# Patient Record
Sex: MALE | Race: BLACK OR AFRICAN AMERICAN | Employment: OTHER | URBAN - METROPOLITAN AREA
[De-identification: names, ages, dates, MRNs, and addresses within clinical notes are randomized per-mention and may not be internally consistent; named-entity substitution may affect disease eponyms.]

---

## 2023-10-19 PROBLEM — M10.9 GOUT: Status: ACTIVE | Noted: 2023-10-19

## 2023-10-19 PROBLEM — M48.062 NEUROGENIC CLAUDICATION DUE TO LUMBAR SPINAL STENOSIS: Status: ACTIVE | Noted: 2023-10-19

## 2023-10-19 PROBLEM — I50.9 HEART FAILURE (MULTI): Status: ACTIVE | Noted: 2023-10-19

## 2023-10-19 PROBLEM — M16.0 BILATERAL HIP JOINT ARTHRITIS: Status: ACTIVE | Noted: 2023-10-19

## 2023-10-19 PROBLEM — M70.62 TROCHANTERIC BURSITIS, LEFT HIP: Status: ACTIVE | Noted: 2023-10-19

## 2023-10-19 PROBLEM — I48.91 ATRIAL FIBRILLATION (MULTI): Status: ACTIVE | Noted: 2023-10-19

## 2023-10-24 ENCOUNTER — HOSPITAL ENCOUNTER (OUTPATIENT)
Dept: RADIOLOGY | Facility: HOSPITAL | Age: 68
Discharge: HOME | End: 2023-10-24
Payer: MEDICARE

## 2023-10-24 DIAGNOSIS — M16.0 BILATERAL HIP JOINT ARTHRITIS: ICD-10-CM

## 2023-10-24 DIAGNOSIS — M48.062 NEUROGENIC CLAUDICATION DUE TO LUMBAR SPINAL STENOSIS: ICD-10-CM

## 2023-10-24 PROCEDURE — 73523 X-RAY EXAM HIPS BI 5/> VIEWS: CPT | Mod: BILATERAL PROCEDURE | Performed by: RADIOLOGY

## 2023-10-24 PROCEDURE — 73522 X-RAY EXAM HIPS BI 3-4 VIEWS: CPT | Mod: FY

## 2023-10-24 PROCEDURE — 72110 X-RAY EXAM L-2 SPINE 4/>VWS: CPT | Mod: FY

## 2023-10-24 PROCEDURE — 72110 X-RAY EXAM L-2 SPINE 4/>VWS: CPT | Performed by: RADIOLOGY

## 2023-11-07 ENCOUNTER — HOSPITAL ENCOUNTER (OUTPATIENT)
Dept: RADIOLOGY | Facility: HOSPITAL | Age: 68
End: 2023-11-07
Payer: MEDICARE

## 2023-11-07 ENCOUNTER — APPOINTMENT (OUTPATIENT)
Dept: PAIN MEDICINE | Facility: HOSPITAL | Age: 68
End: 2023-11-07
Payer: MEDICARE

## 2023-11-07 PROBLEM — E11.9 CONTROLLED TYPE 2 DIABETES MELLITUS WITHOUT COMPLICATION, WITHOUT LONG-TERM CURRENT USE OF INSULIN (MULTI): Status: ACTIVE | Noted: 2019-11-26

## 2023-11-07 PROBLEM — M19.90 ARTHRITIS: Status: ACTIVE | Noted: 2019-11-26

## 2023-11-07 PROBLEM — R29.3 POOR POSTURE: Status: ACTIVE | Noted: 2020-07-27

## 2023-11-07 PROBLEM — F10.20 ALCOHOL DEPENDENCE (MULTI): Status: ACTIVE | Noted: 2023-11-07

## 2023-11-07 PROBLEM — I25.83 CORONARY ATHEROSCLEROSIS DUE TO LIPID RICH PLAQUE (CODE): Status: ACTIVE | Noted: 2023-11-07

## 2023-11-07 PROBLEM — R20.2 PARESTHESIA OF SKIN: Status: ACTIVE | Noted: 2023-11-07

## 2023-11-07 PROBLEM — M19.019 PRIMARY LOCALIZED OSTEOARTHROSIS OF SHOULDER REGION: Status: ACTIVE | Noted: 2023-11-07

## 2023-11-07 PROBLEM — R94.31 EKG ABNORMALITIES: Status: ACTIVE | Noted: 2020-02-10

## 2023-11-07 PROBLEM — I16.0 HYPERTENSIVE URGENCY: Status: ACTIVE | Noted: 2020-02-11

## 2023-11-07 PROBLEM — N40.0 BPH (BENIGN PROSTATIC HYPERPLASIA): Status: ACTIVE | Noted: 2020-03-24

## 2023-11-07 PROBLEM — M19.90 OSTEOARTHRITIS: Status: ACTIVE | Noted: 2023-11-07

## 2023-11-07 PROBLEM — Z59.00 HOMELESS: Status: ACTIVE | Noted: 2023-11-07

## 2023-11-07 PROBLEM — I10 BENIGN HYPERTENSION: Status: ACTIVE | Noted: 2023-11-07

## 2023-11-07 PROBLEM — E83.39 HYPOPHOSPHATEMIA: Status: ACTIVE | Noted: 2019-12-04

## 2023-11-07 PROBLEM — E78.2 MIXED HYPERLIPIDEMIA: Status: ACTIVE | Noted: 2019-11-26

## 2023-11-07 PROBLEM — R73.9 HYPERGLYCEMIA: Status: ACTIVE | Noted: 2018-11-30

## 2023-11-07 PROBLEM — D64.9 ANEMIA: Status: ACTIVE | Noted: 2019-11-26

## 2023-11-07 PROBLEM — R91.1 SOLITARY PULMONARY NODULE: Status: ACTIVE | Noted: 2023-11-07

## 2023-11-07 PROBLEM — I50.42 CHRONIC COMBINED SYSTOLIC AND DIASTOLIC CHF (CONGESTIVE HEART FAILURE) (MULTI): Status: ACTIVE | Noted: 2019-11-07

## 2023-11-07 RX ORDER — ISOSORBIDE MONONITRATE 120 MG/1
120 TABLET, EXTENDED RELEASE ORAL EVERY MORNING
COMMUNITY
Start: 2022-10-14 | End: 2024-03-11 | Stop reason: SDUPTHER

## 2023-11-07 RX ORDER — SPIRONOLACTONE 50 MG/1
50 TABLET, FILM COATED ORAL DAILY
COMMUNITY
Start: 2023-10-10 | End: 2024-03-11 | Stop reason: SDUPTHER

## 2023-11-07 RX ORDER — HYDRALAZINE HYDROCHLORIDE 100 MG/1
1 TABLET, FILM COATED ORAL
COMMUNITY
Start: 2022-10-13 | End: 2024-03-11 | Stop reason: SDUPTHER

## 2023-11-07 RX ORDER — FLUTICASONE PROPIONATE 50 MCG
2 SPRAY, SUSPENSION (ML) NASAL
COMMUNITY
Start: 2007-07-25 | End: 2024-02-28 | Stop reason: ENTERED-IN-ERROR

## 2023-11-07 RX ORDER — CARVEDILOL 25 MG/1
1 TABLET ORAL
COMMUNITY
Start: 2020-08-11 | End: 2024-03-11 | Stop reason: SDUPTHER

## 2023-11-07 RX ORDER — FERROUS GLUCONATE 324(38)MG
324 TABLET ORAL
COMMUNITY
Start: 2021-12-01

## 2023-11-07 RX ORDER — TALC
3 POWDER (GRAM) TOPICAL NIGHTLY PRN
COMMUNITY
Start: 2018-12-03 | End: 2024-02-28 | Stop reason: ENTERED-IN-ERROR

## 2023-11-07 RX ORDER — IBUPROFEN 800 MG/1
800 TABLET ORAL EVERY 8 HOURS PRN
COMMUNITY
Start: 2023-01-26 | End: 2024-02-28 | Stop reason: ENTERED-IN-ERROR

## 2023-11-07 RX ORDER — ALLOPURINOL 300 MG/1
1 TABLET ORAL DAILY
COMMUNITY
Start: 2020-08-11

## 2023-11-07 RX ORDER — FUROSEMIDE 20 MG/1
1 TABLET ORAL DAILY
COMMUNITY
Start: 2022-04-24 | End: 2024-03-11 | Stop reason: SDUPTHER

## 2023-11-07 RX ORDER — TRAZODONE HYDROCHLORIDE 50 MG/1
1 TABLET ORAL NIGHTLY
COMMUNITY
Start: 2020-08-11 | End: 2024-02-28 | Stop reason: ENTERED-IN-ERROR

## 2023-11-07 RX ORDER — RIVAROXABAN 20 MG/1
1 TABLET, FILM COATED ORAL
COMMUNITY
Start: 2022-11-15 | End: 2024-02-28 | Stop reason: ENTERED-IN-ERROR

## 2023-11-07 RX ORDER — POTASSIUM CHLORIDE 750 MG/1
1 TABLET, EXTENDED RELEASE ORAL DAILY
COMMUNITY
End: 2024-02-28 | Stop reason: ENTERED-IN-ERROR

## 2023-11-07 RX ORDER — TAMSULOSIN HYDROCHLORIDE 0.4 MG/1
CAPSULE ORAL
COMMUNITY
Start: 2020-01-08

## 2023-11-07 RX ORDER — MAGNESIUM OXIDE 420 MG/1
1 TABLET ORAL 2 TIMES DAILY
COMMUNITY
Start: 2021-12-01 | End: 2024-02-28 | Stop reason: ENTERED-IN-ERROR

## 2023-11-07 RX ORDER — OMEGA-3 FATTY ACIDS 1000 MG
1 CAPSULE ORAL
COMMUNITY
Start: 2021-03-22

## 2023-11-07 RX ORDER — ATORVASTATIN CALCIUM 40 MG/1
1 TABLET, FILM COATED ORAL NIGHTLY
COMMUNITY
Start: 2020-08-11 | End: 2024-03-02 | Stop reason: HOSPADM

## 2023-11-07 RX ORDER — NAPROXEN SODIUM 220 MG/1
1 TABLET, FILM COATED ORAL DAILY
Status: ON HOLD | COMMUNITY
End: 2024-03-02 | Stop reason: SDUPTHER

## 2023-11-07 RX ORDER — CYCLOBENZAPRINE HCL 10 MG
10 TABLET ORAL 2 TIMES DAILY PRN
COMMUNITY
End: 2024-02-28 | Stop reason: ENTERED-IN-ERROR

## 2023-11-07 RX ORDER — HYDRALAZINE HYDROCHLORIDE 50 MG/1
100 TABLET, FILM COATED ORAL
COMMUNITY
Start: 2022-11-09 | End: 2024-02-28 | Stop reason: ENTERED-IN-ERROR

## 2023-11-07 RX ORDER — LANOLIN ALCOHOL/MO/W.PET/CERES
400 CREAM (GRAM) TOPICAL
COMMUNITY
Start: 2020-07-31 | End: 2024-02-28 | Stop reason: ENTERED-IN-ERROR

## 2023-11-07 RX ORDER — DAPAGLIFLOZIN 10 MG/1
1 TABLET, FILM COATED ORAL EVERY MORNING
COMMUNITY
End: 2024-03-11 | Stop reason: SDUPTHER

## 2023-11-07 RX ORDER — POTASSIUM CHLORIDE 20 MEQ/1
20 TABLET, EXTENDED RELEASE ORAL
COMMUNITY
Start: 2020-07-16 | End: 2024-02-28 | Stop reason: ENTERED-IN-ERROR

## 2023-11-07 RX ORDER — ISOSORBIDE DINITRATE 30 MG/1
30 TABLET ORAL 2 TIMES DAILY
COMMUNITY
Start: 2020-08-11 | End: 2024-02-28 | Stop reason: ENTERED-IN-ERROR

## 2023-11-07 RX ORDER — FUROSEMIDE 40 MG/1
40 TABLET ORAL 2 TIMES DAILY
COMMUNITY
Start: 2020-08-11 | End: 2024-02-28 | Stop reason: ENTERED-IN-ERROR

## 2023-11-07 RX ORDER — EPLERENONE 25 MG/1
25 TABLET, FILM COATED ORAL
COMMUNITY
Start: 2020-08-11 | End: 2024-02-28 | Stop reason: ENTERED-IN-ERROR

## 2023-11-07 RX ORDER — NITROGLYCERIN 0.4 MG/1
0.4 TABLET SUBLINGUAL AS NEEDED
COMMUNITY
Start: 2018-12-03 | End: 2024-03-11 | Stop reason: SDUPTHER

## 2023-11-07 RX ORDER — EPLERENONE 50 MG/1
50 TABLET, FILM COATED ORAL
COMMUNITY
Start: 2022-10-14 | End: 2024-02-28 | Stop reason: ENTERED-IN-ERROR

## 2023-11-07 RX ORDER — LIDOCAINE 50 MG/G
PATCH TOPICAL
COMMUNITY
Start: 2023-03-31 | End: 2024-02-28 | Stop reason: ENTERED-IN-ERROR

## 2023-11-08 ENCOUNTER — APPOINTMENT (OUTPATIENT)
Dept: PHYSICAL THERAPY | Facility: CLINIC | Age: 68
End: 2023-11-08
Payer: MEDICARE

## 2023-11-13 ENCOUNTER — APPOINTMENT (OUTPATIENT)
Dept: PAIN MEDICINE | Facility: HOSPITAL | Age: 68
End: 2023-11-13
Payer: MEDICARE

## 2023-11-20 ENCOUNTER — OFFICE VISIT (OUTPATIENT)
Dept: PAIN MEDICINE | Facility: HOSPITAL | Age: 68
End: 2023-11-20
Payer: MEDICARE

## 2023-11-20 DIAGNOSIS — M48.062 LUMBAR STENOSIS WITH NEUROGENIC CLAUDICATION: Primary | ICD-10-CM

## 2023-11-20 DIAGNOSIS — M48.062 NEUROGENIC CLAUDICATION DUE TO LUMBAR SPINAL STENOSIS: ICD-10-CM

## 2023-11-20 PROCEDURE — 99214 OFFICE O/P EST MOD 30 MIN: CPT | Performed by: ANESTHESIOLOGY

## 2023-11-20 PROCEDURE — 1125F AMNT PAIN NOTED PAIN PRSNT: CPT | Performed by: ANESTHESIOLOGY

## 2023-11-20 ASSESSMENT — ENCOUNTER SYMPTOMS
HEMATOLOGIC/LYMPHATIC NEGATIVE: 1
CONSTITUTIONAL NEGATIVE: 1
BACK PAIN: 1
ENDOCRINE NEGATIVE: 1
GASTROINTESTINAL NEGATIVE: 1
CARDIOVASCULAR NEGATIVE: 1
WEAKNESS: 1
NUMBNESS: 1
RESPIRATORY NEGATIVE: 1

## 2023-11-20 ASSESSMENT — PAIN SCALES - GENERAL: PAINLEVEL_OUTOF10: 6

## 2023-11-20 ASSESSMENT — PAIN - FUNCTIONAL ASSESSMENT: PAIN_FUNCTIONAL_ASSESSMENT: 0-10

## 2023-11-20 NOTE — PROGRESS NOTES
"Subjective   Patient ID: Marco Henderson III is a 68 y.o. male.    Back Pain  Associated symptoms include numbness and weakness.     The patient is a 68 YOM NPV who presents for further management of his chronic LBP with radicular sxs to b/l LEs.  PMHx significant for CAD s/p stents, combined systolic/diastolic HF, HTN, BPH, chronic anticoagulation. Of note, patient states he initially presented to [facility] 10/19/23 where he saw Dr. Moser. On chart review, there is no entered note and it appears that patient was marked as a \"no show,\" in the system. He did get orders for MRI L spine, XR L spine/hip, and was started on gabapentin. He states he also was scheduled for a lumbar LILIBETH on 11/7 for which he held his blood thinner (and continues to hold to today). On the day of the supposed scheduled procedure Dr. Nicole was in clinic and could not perform the LILIBETH. As such he received an additional referral for establishment today.     Pain is rated 9-10/10 in severity and functionally limiting, burning/stabbing in quality, associated with weakness/numbness down to b/l LEs. It is worse with activity and improved with rest. He was started on gabapentin and is currently titrating up to max dose, states it has provided mild relief in sxs. MRI L spine 11/2023 significant for diffuse b/l neuroforaminal stenosis L1-S1, disc herniation with central canal stenosis L4/L5, L5/S1.    Review of Systems   Constitutional: Negative.    Respiratory: Negative.     Cardiovascular: Negative.    Gastrointestinal: Negative.    Endocrine: Negative.    Genitourinary: Negative.    Musculoskeletal:  Positive for back pain.   Skin: Negative.    Neurological:  Positive for weakness and numbness.   Hematological: Negative.        Objective   Physical Exam  Constitutional:       Appearance: Normal appearance.   Pulmonary:      Effort: Pulmonary effort is normal.   Abdominal:      General: Abdomen is flat.      Palpations: Abdomen is soft.   Musculoskeletal:    "   Cervical back: Normal range of motion.      Comments: No TTP over spine, flanks, glutes  Full ROM b/l LEs  Normal strength testing/sensation b/l LEs  Normoreflexia  Negative [place]  Negative straight leg testing b/l  Positive JOHN b/l   Skin:     General: Skin is warm and dry.   Neurological:      General: No focal deficit present.      Mental Status: He is alert and oriented to person, place, and time.         Assessment/Plan   The patient is a 68 YOM NPV who presents for further management of his chronic LBP with radicular sxs to b/l LEs. Initially scheduled for a L4/5 epidural steroid injection on 11/7 for which he held his blood thinner (and continues to hold to today). MRI L spine 11/2023 significant for diffuse b/l neuroforaminal stenosis L1-S1, disc herniation with central canal stenosis L4/L5, L5/S1.    - Will schedule for L4-L5 interlaminar LILIBETH tomorrow 11/21 given patient has held his anticoagulation since 11/2. Risks, benefits and alternatives of the procedure were discussed with the patient who expressed understanding and agrees to proceed.   - Continue with gabapentin titration schedule  - Encouraged activity/exercise    Patient seen and discussed with Dr. Nicole.

## 2023-11-21 ENCOUNTER — HOSPITAL ENCOUNTER (OUTPATIENT)
Dept: RADIOLOGY | Facility: HOSPITAL | Age: 68
Discharge: HOME | End: 2023-11-21
Payer: MEDICARE

## 2023-11-21 VITALS
DIASTOLIC BLOOD PRESSURE: 66 MMHG | RESPIRATION RATE: 18 BRPM | OXYGEN SATURATION: 99 % | SYSTOLIC BLOOD PRESSURE: 129 MMHG | WEIGHT: 187 LBS | TEMPERATURE: 98 F | HEART RATE: 65 BPM | BODY MASS INDEX: 28.34 KG/M2 | HEIGHT: 68 IN

## 2023-11-21 DIAGNOSIS — M48.062 NEUROGENIC CLAUDICATION DUE TO LUMBAR SPINAL STENOSIS: ICD-10-CM

## 2023-11-21 DIAGNOSIS — M48.062 LUMBAR STENOSIS WITH NEUROGENIC CLAUDICATION: ICD-10-CM

## 2023-11-21 DIAGNOSIS — M48.062 LUMBAR STENOSIS WITH NEUROGENIC CLAUDICATION: Primary | ICD-10-CM

## 2023-11-21 PROCEDURE — 77003 FLUOROGUIDE FOR SPINE INJECT: CPT

## 2023-11-21 PROCEDURE — 62323 NJX INTERLAMINAR LMBR/SAC: CPT | Performed by: ANESTHESIOLOGY

## 2023-11-21 ASSESSMENT — PAIN SCALES - GENERAL
PAINLEVEL_OUTOF10: 0 - NO PAIN
PAINLEVEL_OUTOF10: 5 - MODERATE PAIN
PAINLEVEL_OUTOF10: 4
PAINLEVEL_OUTOF10: 5 - MODERATE PAIN

## 2023-11-21 ASSESSMENT — PAIN DESCRIPTION - DESCRIPTORS: DESCRIPTORS: PRESSURE

## 2023-11-21 ASSESSMENT — PAIN - FUNCTIONAL ASSESSMENT: PAIN_FUNCTIONAL_ASSESSMENT: 0-10

## 2023-11-21 NOTE — H&P
Pain Management H&P    History Of Present Illness  Marco Henderson III is a 68 y.o. male with a past medical history of chronic low back pain with radicular symptoms to [facility] who presents for lumbar epidural steroid injection.     Past Medical History  He has no past medical history on file.    Surgical History  He has no past surgical history on file.     Social History  He has no history on file for tobacco use, alcohol use, and drug use.    Family History  No family history on file.     Allergies  Lisinopril and Valsartan    Review of Symptoms:   Constitutional: Negative for chills, diaphoresis or fever  HENT: Negative for neck swelling  Eyes:.  Negative for eye pain  Respiratory:.  Negative for cough, shortness of breath or wheezing    Cardiovascular:.  Negative for chest pain or palpitations  Gastrointestinal:.  Negative for abdominal pain, nausea and vomiting  Genitourinary:.  Negative for urgency  Musculoskeletal:  Positive for back pain. Positive for joint pain. Denies falls within the past 3 months.  Skin: Negative for wounds or itching   Neurological: Negative for dizziness, seizures, loss of consciousness and weakness  Endo/Heme/Allergies: Does not bruise/bleed easily  Psychiatric/Behavioral: Negative for depression. The patient does not appear anxious.       PHYSICAL EXAM  Vitals signs reviewed  Constitutional:       General: Not in acute distress     Appearance: Normal appearance. Not ill-appearing.  HENT:     Head: Normocephalic and atraumatic  Eyes:     Conjunctiva/sclera: Conjunctivae normal  Cardiovascular:     Rate and Rhythm: Normal rate and regular rhythm  Pulmonary:     Effort: No respiratory distress  Abdominal:     Palpations: Abdomen is soft  Musculoskeletal: MAJANO  Skin:     General: Skin is warm and dry  Neurological:     General: No focal deficit present  Psychiatric:         Mood and Affect: Mood normal         Behavior: Behavior normal     Last Recorded Vitals  There were no vitals taken for  this visit.    Relevant Results  Current Outpatient Medications   Medication Instructions    allopurinol (Zyloprim) 300 mg tablet 1 tablet, oral, Daily    aspirin 81 mg chewable tablet 1 tablet, oral, Daily    atorvastatin (Lipitor) 40 mg tablet 1 tablet, oral, Nightly    blood sugar diagnostic strip Use as instructed    carvedilol (Coreg) 25 mg tablet 1 tablet, oral, 2 times daily with meals    cyclobenzaprine (FLEXERIL) 10 mg, oral, 2 times daily PRN    DOCOSAHEXAENOIC ACID ORAL 1 capsule, oral    eplerenone (INSPRA) 25 mg, oral, Daily RT    eplerenone (INSPRA) 50 mg, oral, Daily RT    Farxiga 10 mg 1 tablet, oral, Every morning    ferrous gluconate (FERGON) 324 mg, oral, Daily RT    fluticasone (Flonase) 50 mcg/actuation nasal spray 2 sprays, Each Nostril, Daily RT    furosemide (Lasix) 20 mg tablet 1 tablet, oral, Daily    furosemide (LASIX) 40 mg, oral, 2 times daily    gabapentin (Neurontin) 300 mg capsule Day 1 Take 300mg at bedtime(one pill), Day 7 600mg at bedtime, (two pills)    hydrALAZINE (Apresoline) 100 mg tablet 1 tablet, oral, 3 times daily with meals    hydrALAZINE (APRESOLINE) 100 mg, oral, 3 times daily with meals    ibuprofen 800 mg, oral, Every 8 hours PRN    isosorbide dinitrate (ISORDIL) 30 mg, oral, 2 times daily    isosorbide mononitrate ER (IMDUR) 120 mg, oral, Every morning    Klor-Con M10 10 mEq ER tablet 1 tablet, oral, Daily    lidocaine (Lidoderm) 5 % patch APPLY TWO PATCHES TO CLEAN DRY SKIN EVERY DAY FOR LEG PAIN.  PATCH SHOULD REMAIN ON SKIN NO LONGER THAN 12 HOURS IN ANY 24 HOUR PERIOD.    magnesium oxide (Mag-Ox) 420 mg tablet 1 tablet, oral, 2 times daily    magnesium oxide (MAG-OX) 400 mg, oral, Daily RT    melatonin 3 mg, oral, Nightly PRN    multivit-mins/iron/folic/lycop (CENTRUM MEN ORAL) 1 tablet, oral    nitroglycerin (NITROSTAT) 0.4 mg, sublingual, As needed    omega-3 1,000 mg capsule capsule 1 tablet, oral, Daily RT    potassium chloride CR 20 mEq ER tablet 20 mEq,  oral, Daily RT    spironolactone (ALDACTONE) 50 mg, oral, Daily    tamsulosin (Flomax) 0.4 mg 24 hr capsule TAKE 1 CAPSULE BY MOUTH EVERY DAY 1/2 HOUR FOLLOWING THE SAME MEAL EACH DAY    traZODone (Desyrel) 50 mg tablet 1 tablet, oral, Nightly    Xarelto 20 mg tablet 1 tablet, oral, Daily with evening meal       No results found for this or any previous visit from the past 1000 days.     No image results found.       1. Neurogenic claudication due to lumbar spinal stenosis  Epidural Steroid Injection    Epidural Steroid Injection           ASSESSMENT/PLAN  aMrco Henderson III is a 68 y.o. male with a past medical history of chronic low back pain with radicular symptoms to BLE who presents for L4-5 interlaminar lumbar epidural steroid injection. Has held anticoagulation since 11/2.     Our plan is as follows:  - Proceed with aforementioned procedure        Marine Galaviz DO   Pain fellow

## 2023-11-21 NOTE — PROCEDURES
Pre-Op Diagnosis: Lumbar spinal stenosis with neurogenic claudication   Post-Procedure Diagnosis: Same as preop diagnosis  Procedure: L4/L5 interlaminar epidural steroid injection with fluoroscopic guidance    Surgeon: Miranda Nicole MD, PhD   Resident/Fellow/Other Assistant: Marine Galaviz DO     Procedure Note:     Mr. Marco Henderson III is a 68 y.o. male with symptoms of neurogenic claudication and evidence on MRI of L4/5 disc displacement and severe central canal stenosis presents today for an L4/5 interlaminar epidural steroid injection, his first.    After informed consent was obtained, the patient was brought to the operating room and placed in the prone position.  The back area was prepped and draped in the usual sterile fashion.  Using fluoroscopic guidance, the skin and subcutaneous tissue overlying needle trajectory to the L4-L5 interlaminar epidural space were anesthetized with a total of 2 mL 0.5% lidocaine in a right paraspinous approach.  An 18-gauge Tuohy needle was then advanced under fluoroscopic guidance with a right paraspinous approach into the L4-5 interlaminar epidural space. Entry of the epidural space was confirmed using loss of resistance technique with a glass syringe and 2 mL of air.  Injection of Iohexol contrast revealed appropriate spread without vascular uptake. Subsequently, 7 mL of 0.5% lidocaine and 40 mg methylprednisolone were injected.  The needle was removed.  The patient tolerated the procedure well.  He was then transferred to recovery room in stable condition. The patient will participate in physical therapy, update us on his response, follow up with us on outpatient basis as needed.

## 2024-02-28 ENCOUNTER — HOSPITAL ENCOUNTER (INPATIENT)
Facility: HOSPITAL | Age: 69
LOS: 3 days | Discharge: HOME | DRG: 321 | End: 2024-03-02
Attending: EMERGENCY MEDICINE | Admitting: INTERNAL MEDICINE
Payer: MEDICARE

## 2024-02-28 ENCOUNTER — APPOINTMENT (OUTPATIENT)
Dept: CARDIOLOGY | Facility: HOSPITAL | Age: 69
DRG: 321 | End: 2024-02-28
Payer: MEDICARE

## 2024-02-28 ENCOUNTER — APPOINTMENT (OUTPATIENT)
Dept: RADIOLOGY | Facility: HOSPITAL | Age: 69
DRG: 321 | End: 2024-02-28
Payer: MEDICARE

## 2024-02-28 DIAGNOSIS — I20.2 REFRACTORY ANGINA PECTORIS (CMS-HCC): ICD-10-CM

## 2024-02-28 DIAGNOSIS — I50.41 ACUTE COMBINED SYSTOLIC AND DIASTOLIC HEART FAILURE (MULTI): ICD-10-CM

## 2024-02-28 DIAGNOSIS — I25.83 CORONARY ATHEROSCLEROSIS DUE TO LIPID RICH PLAQUE (CODE): ICD-10-CM

## 2024-02-28 DIAGNOSIS — I25.42 CORONARY ARTERY DISSECTION: ICD-10-CM

## 2024-02-28 DIAGNOSIS — I21.3 ST ELEVATION (STEMI) MYOCARDIAL INFARCTION OF UNSPECIFIED SITE (MULTI): ICD-10-CM

## 2024-02-28 DIAGNOSIS — I48.11 LONGSTANDING PERSISTENT ATRIAL FIBRILLATION (MULTI): ICD-10-CM

## 2024-02-28 DIAGNOSIS — I31.39 OTHER PERICARDIAL EFFUSION (NONINFLAMMATORY) (HHS-HCC): ICD-10-CM

## 2024-02-28 DIAGNOSIS — R07.9 CHEST PAIN, UNSPECIFIED TYPE: Primary | ICD-10-CM

## 2024-02-28 PROBLEM — I44.7 LBBB (LEFT BUNDLE BRANCH BLOCK): Status: ACTIVE | Noted: 2024-02-28

## 2024-02-28 LAB
ALBUMIN SERPL BCP-MCNC: 3.9 G/DL (ref 3.4–5)
ALP SERPL-CCNC: 66 U/L (ref 33–136)
ALT SERPL W P-5'-P-CCNC: 15 U/L (ref 10–52)
ANION GAP SERPL CALC-SCNC: 12 MMOL/L (ref 10–20)
AORTIC VALVE MEAN GRADIENT: 5 MMHG
AORTIC VALVE PEAK VELOCITY: 1.51 M/S
AST SERPL W P-5'-P-CCNC: 15 U/L (ref 9–39)
AV PEAK GRADIENT: 9.1 MMHG
AVA (PEAK VEL): 2.18 CM2
AVA (VTI): 2.1 CM2
BASOPHILS # BLD AUTO: 0.03 X10*3/UL (ref 0–0.1)
BASOPHILS NFR BLD AUTO: 0.6 %
BILIRUB SERPL-MCNC: 0.7 MG/DL (ref 0–1.2)
BODY SURFACE AREA: 2.02 M2
BUN SERPL-MCNC: 20 MG/DL (ref 6–23)
CALCIUM SERPL-MCNC: 8.9 MG/DL (ref 8.6–10.3)
CARDIAC TROPONIN I PNL SERPL HS: 14 NG/L (ref 0–20)
CARDIAC TROPONIN I PNL SERPL HS: 8 NG/L (ref 0–20)
CHLORIDE SERPL-SCNC: 105 MMOL/L (ref 98–107)
CO2 SERPL-SCNC: 25 MMOL/L (ref 21–32)
CREAT SERPL-MCNC: 1.04 MG/DL (ref 0.5–1.3)
EGFRCR SERPLBLD CKD-EPI 2021: 77 ML/MIN/1.73M*2
EJECTION FRACTION APICAL 4 CHAMBER: 57.7
EJECTION FRACTION: 55 %
EOSINOPHIL # BLD AUTO: 0.2 X10*3/UL (ref 0–0.7)
EOSINOPHIL NFR BLD AUTO: 3.9 %
ERYTHROCYTE [DISTWIDTH] IN BLOOD BY AUTOMATED COUNT: 13.2 % (ref 11.5–14.5)
GLUCOSE BLD MANUAL STRIP-MCNC: 119 MG/DL (ref 74–99)
GLUCOSE BLD MANUAL STRIP-MCNC: 150 MG/DL (ref 74–99)
GLUCOSE SERPL-MCNC: 117 MG/DL (ref 74–99)
HCT VFR BLD AUTO: 38.4 % (ref 41–52)
HGB BLD-MCNC: 12.7 G/DL (ref 13.5–17.5)
IMM GRANULOCYTES # BLD AUTO: 0.01 X10*3/UL (ref 0–0.7)
IMM GRANULOCYTES NFR BLD AUTO: 0.2 % (ref 0–0.9)
INR PPP: 1 (ref 0.9–1.1)
LEFT ATRIUM VOLUME AREA LENGTH INDEX BSA: 44.2 ML/M2
LEFT VENTRICLE INTERNAL DIMENSION DIASTOLE: 5.25 CM (ref 3.5–6)
LEFT VENTRICULAR OUTFLOW TRACT DIAMETER: 2.2 CM
LYMPHOCYTES # BLD AUTO: 1.18 X10*3/UL (ref 1.2–4.8)
LYMPHOCYTES NFR BLD AUTO: 23.2 %
MCH RBC QN AUTO: 30.4 PG (ref 26–34)
MCHC RBC AUTO-ENTMCNC: 33.1 G/DL (ref 32–36)
MCV RBC AUTO: 92 FL (ref 80–100)
MITRAL VALVE E/A RATIO: 0.67
MITRAL VALVE E/E' RATIO: 9.6
MONOCYTES # BLD AUTO: 0.45 X10*3/UL (ref 0.1–1)
MONOCYTES NFR BLD AUTO: 8.9 %
NEUTROPHILS # BLD AUTO: 3.21 X10*3/UL (ref 1.2–7.7)
NEUTROPHILS NFR BLD AUTO: 63.2 %
NRBC BLD-RTO: 0 /100 WBCS (ref 0–0)
PLATELET # BLD AUTO: 219 X10*3/UL (ref 150–450)
POTASSIUM SERPL-SCNC: 3.5 MMOL/L (ref 3.5–5.3)
PROT SERPL-MCNC: 7.3 G/DL (ref 6.4–8.2)
PROTHROMBIN TIME: 11.8 SECONDS (ref 9.8–12.8)
RBC # BLD AUTO: 4.18 X10*6/UL (ref 4.5–5.9)
RIGHT VENTRICLE PEAK SYSTOLIC PRESSURE: 32.4 MMHG
SODIUM SERPL-SCNC: 138 MMOL/L (ref 136–145)
TRICUSPID ANNULAR PLANE SYSTOLIC EXCURSION: 2.5 CM
UFH PPP CHRO-ACNC: 0.4 IU/ML
UFH PPP CHRO-ACNC: 0.6 IU/ML
WBC # BLD AUTO: 5.1 X10*3/UL (ref 4.4–11.3)

## 2024-02-28 PROCEDURE — 85025 COMPLETE CBC W/AUTO DIFF WBC: CPT | Performed by: EMERGENCY MEDICINE

## 2024-02-28 PROCEDURE — 36415 COLL VENOUS BLD VENIPUNCTURE: CPT | Performed by: EMERGENCY MEDICINE

## 2024-02-28 PROCEDURE — 85520 HEPARIN ASSAY: CPT | Performed by: NURSE PRACTITIONER

## 2024-02-28 PROCEDURE — 99223 1ST HOSP IP/OBS HIGH 75: CPT | Performed by: INTERNAL MEDICINE

## 2024-02-28 PROCEDURE — 71045 X-RAY EXAM CHEST 1 VIEW: CPT | Performed by: RADIOLOGY

## 2024-02-28 PROCEDURE — 71045 X-RAY EXAM CHEST 1 VIEW: CPT

## 2024-02-28 PROCEDURE — 93005 ELECTROCARDIOGRAM TRACING: CPT

## 2024-02-28 PROCEDURE — 80053 COMPREHEN METABOLIC PANEL: CPT | Performed by: EMERGENCY MEDICINE

## 2024-02-28 PROCEDURE — 2500000004 HC RX 250 GENERAL PHARMACY W/ HCPCS (ALT 636 FOR OP/ED)

## 2024-02-28 PROCEDURE — 93306 TTE W/DOPPLER COMPLETE: CPT | Performed by: INTERNAL MEDICINE

## 2024-02-28 PROCEDURE — 84484 ASSAY OF TROPONIN QUANT: CPT | Performed by: EMERGENCY MEDICINE

## 2024-02-28 PROCEDURE — 2500000004 HC RX 250 GENERAL PHARMACY W/ HCPCS (ALT 636 FOR OP/ED): Performed by: EMERGENCY MEDICINE

## 2024-02-28 PROCEDURE — 99223 1ST HOSP IP/OBS HIGH 75: CPT | Performed by: NURSE PRACTITIONER

## 2024-02-28 PROCEDURE — 93010 ELECTROCARDIOGRAM REPORT: CPT | Performed by: STUDENT IN AN ORGANIZED HEALTH CARE EDUCATION/TRAINING PROGRAM

## 2024-02-28 PROCEDURE — 96374 THER/PROPH/DIAG INJ IV PUSH: CPT

## 2024-02-28 PROCEDURE — 2500000001 HC RX 250 WO HCPCS SELF ADMINISTERED DRUGS (ALT 637 FOR MEDICARE OP): Performed by: EMERGENCY MEDICINE

## 2024-02-28 PROCEDURE — 85520 HEPARIN ASSAY: CPT | Performed by: EMERGENCY MEDICINE

## 2024-02-28 PROCEDURE — 99285 EMERGENCY DEPT VISIT HI MDM: CPT | Mod: 25

## 2024-02-28 PROCEDURE — 2500000001 HC RX 250 WO HCPCS SELF ADMINISTERED DRUGS (ALT 637 FOR MEDICARE OP): Performed by: NURSE PRACTITIONER

## 2024-02-28 PROCEDURE — 2060000001 HC INTERMEDIATE ICU ROOM DAILY

## 2024-02-28 PROCEDURE — 2500000001 HC RX 250 WO HCPCS SELF ADMINISTERED DRUGS (ALT 637 FOR MEDICARE OP): Performed by: INTERNAL MEDICINE

## 2024-02-28 PROCEDURE — 85610 PROTHROMBIN TIME: CPT | Performed by: EMERGENCY MEDICINE

## 2024-02-28 PROCEDURE — 2500000005 HC RX 250 GENERAL PHARMACY W/O HCPCS: Performed by: INTERNAL MEDICINE

## 2024-02-28 PROCEDURE — 93306 TTE W/DOPPLER COMPLETE: CPT

## 2024-02-28 PROCEDURE — 82947 ASSAY GLUCOSE BLOOD QUANT: CPT

## 2024-02-28 RX ORDER — NITROGLYCERIN 0.4 MG/1
0.4 TABLET SUBLINGUAL EVERY 5 MIN PRN
Status: DISCONTINUED | OUTPATIENT
Start: 2024-02-28 | End: 2024-02-28

## 2024-02-28 RX ORDER — HEPARIN SODIUM 5000 [USP'U]/ML
4000 INJECTION, SOLUTION INTRAVENOUS; SUBCUTANEOUS ONCE
Status: COMPLETED | OUTPATIENT
Start: 2024-02-28 | End: 2024-02-28

## 2024-02-28 RX ORDER — ISOSORBIDE MONONITRATE 30 MG/1
120 TABLET, EXTENDED RELEASE ORAL EVERY MORNING
Status: DISCONTINUED | OUTPATIENT
Start: 2024-02-29 | End: 2024-03-02 | Stop reason: HOSPADM

## 2024-02-28 RX ORDER — NAPROXEN SODIUM 220 MG/1
TABLET, FILM COATED ORAL CODE/TRAUMA/SEDATION MEDICATION
Status: COMPLETED | OUTPATIENT
Start: 2024-02-28 | End: 2024-02-28

## 2024-02-28 RX ORDER — ATORVASTATIN CALCIUM 80 MG/1
80 TABLET, FILM COATED ORAL NIGHTLY
Status: DISCONTINUED | OUTPATIENT
Start: 2024-02-28 | End: 2024-03-02 | Stop reason: HOSPADM

## 2024-02-28 RX ORDER — NITROGLYCERIN 0.4 MG/1
TABLET SUBLINGUAL CODE/TRAUMA/SEDATION MEDICATION
Status: COMPLETED | OUTPATIENT
Start: 2024-02-28 | End: 2024-02-28

## 2024-02-28 RX ORDER — DOCUSATE SODIUM 100 MG/1
100 CAPSULE, LIQUID FILLED ORAL 2 TIMES DAILY
Status: DISCONTINUED | OUTPATIENT
Start: 2024-02-28 | End: 2024-03-02 | Stop reason: HOSPADM

## 2024-02-28 RX ORDER — HYDRALAZINE HYDROCHLORIDE 50 MG/1
100 TABLET, FILM COATED ORAL
Status: DISCONTINUED | OUTPATIENT
Start: 2024-02-28 | End: 2024-02-28

## 2024-02-28 RX ORDER — HEPARIN SODIUM 5000 [USP'U]/ML
2000-4000 INJECTION, SOLUTION INTRAVENOUS; SUBCUTANEOUS EVERY 4 HOURS PRN
Status: DISCONTINUED | OUTPATIENT
Start: 2024-02-28 | End: 2024-03-01

## 2024-02-28 RX ORDER — TAMSULOSIN HYDROCHLORIDE 0.4 MG/1
0.4 CAPSULE ORAL DAILY
Status: DISCONTINUED | OUTPATIENT
Start: 2024-02-29 | End: 2024-03-02 | Stop reason: HOSPADM

## 2024-02-28 RX ORDER — HEPARIN SODIUM 10000 [USP'U]/100ML
0-4000 INJECTION, SOLUTION INTRAVENOUS CONTINUOUS
Status: DISCONTINUED | OUTPATIENT
Start: 2024-02-28 | End: 2024-03-01

## 2024-02-28 RX ORDER — CARVEDILOL 25 MG/1
25 TABLET ORAL
Status: DISCONTINUED | OUTPATIENT
Start: 2024-02-28 | End: 2024-03-02 | Stop reason: HOSPADM

## 2024-02-28 RX ORDER — HYDRALAZINE HYDROCHLORIDE 50 MG/1
100 TABLET, FILM COATED ORAL 3 TIMES DAILY
Status: DISCONTINUED | OUTPATIENT
Start: 2024-02-28 | End: 2024-03-02 | Stop reason: HOSPADM

## 2024-02-28 RX ORDER — ALLOPURINOL 300 MG/1
300 TABLET ORAL DAILY
Status: DISCONTINUED | OUTPATIENT
Start: 2024-02-29 | End: 2024-03-02 | Stop reason: HOSPADM

## 2024-02-28 RX ORDER — NITROGLYCERIN 0.4 MG/1
TABLET SUBLINGUAL
Status: DISPENSED
Start: 2024-02-28 | End: 2024-02-28

## 2024-02-28 RX ORDER — NITROGLYCERIN 20 MG/100ML
5-200 INJECTION INTRAVENOUS CONTINUOUS
Status: DISCONTINUED | OUTPATIENT
Start: 2024-02-28 | End: 2024-03-02 | Stop reason: HOSPADM

## 2024-02-28 RX ORDER — FERROUS GLUCONATE 325 MG
324 TABLET ORAL DAILY
Status: DISCONTINUED | OUTPATIENT
Start: 2024-02-28 | End: 2024-03-02 | Stop reason: HOSPADM

## 2024-02-28 RX ORDER — NAPROXEN SODIUM 220 MG/1
81 TABLET, FILM COATED ORAL DAILY
Status: DISCONTINUED | OUTPATIENT
Start: 2024-02-29 | End: 2024-03-01

## 2024-02-28 RX ORDER — HEPARIN SODIUM 1000 [USP'U]/ML
4000 INJECTION, SOLUTION INTRAVENOUS; SUBCUTANEOUS ONCE
Status: DISCONTINUED | OUTPATIENT
Start: 2024-02-28 | End: 2024-02-28

## 2024-02-28 RX ORDER — NAPROXEN SODIUM 220 MG/1
324 TABLET, FILM COATED ORAL ONCE
Status: DISCONTINUED | OUTPATIENT
Start: 2024-02-28 | End: 2024-03-02

## 2024-02-28 RX ORDER — FUROSEMIDE 20 MG/1
20 TABLET ORAL DAILY
Status: DISCONTINUED | OUTPATIENT
Start: 2024-02-29 | End: 2024-03-02 | Stop reason: HOSPADM

## 2024-02-28 RX ORDER — ASPIRIN 300 MG/1
SUPPOSITORY RECTAL CODE/TRAUMA/SEDATION MEDICATION
Status: COMPLETED | OUTPATIENT
Start: 2024-02-28 | End: 2024-02-28

## 2024-02-28 RX ORDER — SPIRONOLACTONE 25 MG/1
50 TABLET ORAL DAILY
Status: DISCONTINUED | OUTPATIENT
Start: 2024-02-29 | End: 2024-03-02 | Stop reason: HOSPADM

## 2024-02-28 RX ORDER — DAPAGLIFLOZIN 10 MG/1
10 TABLET, FILM COATED ORAL EVERY MORNING
Status: DISCONTINUED | OUTPATIENT
Start: 2024-02-29 | End: 2024-03-02 | Stop reason: HOSPADM

## 2024-02-28 RX ADMIN — CARVEDILOL 25 MG: 25 TABLET, FILM COATED ORAL at 17:45

## 2024-02-28 RX ADMIN — PERFLUTREN 10 ML OF DILUTION: 6.52 INJECTION, SUSPENSION INTRAVENOUS at 10:47

## 2024-02-28 RX ADMIN — FERROUS GLUCONATE 324 MG: 324 TABLET ORAL at 17:46

## 2024-02-28 RX ADMIN — NITROGLYCERIN 10 MCG/MIN: 20 INJECTION INTRAVENOUS at 10:34

## 2024-02-28 RX ADMIN — HEPARIN SODIUM 1000 UNITS/HR: 10000 INJECTION, SOLUTION INTRAVENOUS at 09:51

## 2024-02-28 RX ADMIN — ASPIRIN 81 MG CHEWABLE TABLET 324 MG: 81 TABLET CHEWABLE at 09:46

## 2024-02-28 RX ADMIN — ATORVASTATIN CALCIUM 80 MG: 80 TABLET, FILM COATED ORAL at 20:21

## 2024-02-28 RX ADMIN — HYDRALAZINE HYDROCHLORIDE 100 MG: 50 TABLET ORAL at 17:45

## 2024-02-28 RX ADMIN — NITROGLYCERIN 0.4 MG: 0.4 TABLET SUBLINGUAL at 09:49

## 2024-02-28 RX ADMIN — NITROGLYCERIN 0.4 MG: 0.4 TABLET SUBLINGUAL at 10:04

## 2024-02-28 RX ADMIN — HEPARIN SODIUM 4000 UNITS: 5000 INJECTION INTRAVENOUS; SUBCUTANEOUS at 09:50

## 2024-02-28 SDOH — SOCIAL STABILITY: SOCIAL INSECURITY: WERE YOU ABLE TO COMPLETE ALL THE BEHAVIORAL HEALTH SCREENINGS?: YES

## 2024-02-28 SDOH — SOCIAL STABILITY: SOCIAL INSECURITY: ARE YOU OR HAVE YOU BEEN THREATENED OR ABUSED PHYSICALLY, EMOTIONALLY, OR SEXUALLY BY ANYONE?: NO

## 2024-02-28 SDOH — SOCIAL STABILITY: SOCIAL INSECURITY: ABUSE: ADULT

## 2024-02-28 SDOH — SOCIAL STABILITY: SOCIAL INSECURITY: HAVE YOU HAD THOUGHTS OF HARMING ANYONE ELSE?: NO

## 2024-02-28 SDOH — SOCIAL STABILITY: SOCIAL INSECURITY: DO YOU FEEL ANYONE HAS EXPLOITED OR TAKEN ADVANTAGE OF YOU FINANCIALLY OR OF YOUR PERSONAL PROPERTY?: NO

## 2024-02-28 SDOH — SOCIAL STABILITY: SOCIAL INSECURITY: DO YOU FEEL UNSAFE GOING BACK TO THE PLACE WHERE YOU ARE LIVING?: NO

## 2024-02-28 SDOH — SOCIAL STABILITY: SOCIAL INSECURITY: DOES ANYONE TRY TO KEEP YOU FROM HAVING/CONTACTING OTHER FRIENDS OR DOING THINGS OUTSIDE YOUR HOME?: NO

## 2024-02-28 SDOH — SOCIAL STABILITY: SOCIAL INSECURITY: ARE THERE ANY APPARENT SIGNS OF INJURIES/BEHAVIORS THAT COULD BE RELATED TO ABUSE/NEGLECT?: NO

## 2024-02-28 SDOH — SOCIAL STABILITY: SOCIAL INSECURITY: HAS ANYONE EVER THREATENED TO HURT YOUR FAMILY OR YOUR PETS?: NO

## 2024-02-28 ASSESSMENT — PATIENT HEALTH QUESTIONNAIRE - PHQ9
1. LITTLE INTEREST OR PLEASURE IN DOING THINGS: NOT AT ALL
2. FEELING DOWN, DEPRESSED OR HOPELESS: NOT AT ALL
SUM OF ALL RESPONSES TO PHQ9 QUESTIONS 1 & 2: 0

## 2024-02-28 ASSESSMENT — PAIN DESCRIPTION - LOCATION: LOCATION: ARM

## 2024-02-28 ASSESSMENT — COGNITIVE AND FUNCTIONAL STATUS - GENERAL
PATIENT BASELINE BEDBOUND: NO
MOBILITY SCORE: 24
MOBILITY SCORE: 24
DAILY ACTIVITIY SCORE: 24
DAILY ACTIVITIY SCORE: 24

## 2024-02-28 ASSESSMENT — COLUMBIA-SUICIDE SEVERITY RATING SCALE - C-SSRS
6. HAVE YOU EVER DONE ANYTHING, STARTED TO DO ANYTHING, OR PREPARED TO DO ANYTHING TO END YOUR LIFE?: NO
1. IN THE PAST MONTH, HAVE YOU WISHED YOU WERE DEAD OR WISHED YOU COULD GO TO SLEEP AND NOT WAKE UP?: NO
2. HAVE YOU ACTUALLY HAD ANY THOUGHTS OF KILLING YOURSELF?: NO

## 2024-02-28 ASSESSMENT — ACTIVITIES OF DAILY LIVING (ADL)
ADEQUATE_TO_COMPLETE_ADL: YES
HEARING - RIGHT EAR: FUNCTIONAL
PATIENT'S MEMORY ADEQUATE TO SAFELY COMPLETE DAILY ACTIVITIES?: YES
BATHING: INDEPENDENT
WALKS IN HOME: INDEPENDENT
LACK_OF_TRANSPORTATION: NO
HEARING - LEFT EAR: FUNCTIONAL
TOILETING: INDEPENDENT
FEEDING YOURSELF: INDEPENDENT
GROOMING: INDEPENDENT
DRESSING YOURSELF: INDEPENDENT
JUDGMENT_ADEQUATE_SAFELY_COMPLETE_DAILY_ACTIVITIES: YES

## 2024-02-28 ASSESSMENT — PAIN SCALES - GENERAL
PAINLEVEL_OUTOF10: 4
PAINLEVEL_OUTOF10: 5 - MODERATE PAIN
PAINLEVEL_OUTOF10: 0 - NO PAIN
PAINLEVEL_OUTOF10: 5 - MODERATE PAIN

## 2024-02-28 ASSESSMENT — LIFESTYLE VARIABLES
AUDIT-C TOTAL SCORE: 8
HOW MANY STANDARD DRINKS CONTAINING ALCOHOL DO YOU HAVE ON A TYPICAL DAY: 3 OR 4
HOW OFTEN DO YOU HAVE 6 OR MORE DRINKS ON ONE OCCASION: WEEKLY
AUDIT-C TOTAL SCORE: 8
SKIP TO QUESTIONS 9-10: 0
HOW OFTEN DO YOU HAVE A DRINK CONTAINING ALCOHOL: 4 OR MORE TIMES A WEEK

## 2024-02-28 ASSESSMENT — PAIN - FUNCTIONAL ASSESSMENT
PAIN_FUNCTIONAL_ASSESSMENT: 0-10
PAIN_FUNCTIONAL_ASSESSMENT: 0-10

## 2024-02-28 ASSESSMENT — ENCOUNTER SYMPTOMS
GASTROINTESTINAL NEGATIVE: 1
ACTIVITY CHANGE: 1
ENDOCRINE NEGATIVE: 1
RESPIRATORY NEGATIVE: 1
MUSCULOSKELETAL NEGATIVE: 1
NEUROLOGICAL NEGATIVE: 1
HEMATOLOGIC/LYMPHATIC NEGATIVE: 1
PSYCHIATRIC NEGATIVE: 1

## 2024-02-28 ASSESSMENT — PAIN DESCRIPTION - DESCRIPTORS: DESCRIPTORS: CRUSHING;PRESSURE

## 2024-02-28 ASSESSMENT — PAIN DESCRIPTION - ORIENTATION: ORIENTATION: LEFT

## 2024-02-28 NOTE — Clinical Note
Angioplasty of the left anterior descending lesion. Inflation 1: Pressure = 22 colton; Duration = 20 sec.

## 2024-02-28 NOTE — PROGRESS NOTES
Pharmacy Medication History Review    Marco Henderson III is a 68 y.o. male admitted for Chest pain, unspecified type. Pharmacy reviewed the patient's [id number] medications and allergies for accuracy.    The list below reflectives the updated PTA list. Please review each medication in order reconciliation for additional clarification and justification.  Prior to Admission Medications   Prescriptions Last Dose Informant     Farxiga 10 mg 2/28/2024 at am      Sig: Take 1 tablet (10 mg) by mouth once daily in the morning.   allopurinol (Zyloprim) 300 mg tablet 2/28/2024 at am      Sig: Take 1 tablet (300 mg) by mouth once daily.   aspirin 81 mg chewable tablet 2/28/2024 at am      Sig: Chew 1 tablet (81 mg) once daily.   atorvastatin (Lipitor) 40 mg tablet 2/27/2024 at pm      Sig: Take 1 tablet (40 mg) by mouth once daily at bedtime.   blood sugar diagnostic strip       Sig: Use as instructed   carvedilol (Coreg) 25 mg tablet 2/28/2024 at am      Sig: Take 1 tablet (25 mg) by mouth 2 times a day with meals.   ferrous gluconate 324 (38 Fe) mg tablet 2/28/2024      Sig: Take 1 tablet (324 mg) by mouth once daily.   furosemide (Lasix) 20 mg tablet 2/28/2024 at am      Sig: Take 1 tablet (20 mg) by mouth once daily.   hydrALAZINE (Apresoline) 100 mg tablet 2/28/2024 at am      Sig: Take 1 tablet (100 mg) by mouth 3 times a day with meals.   isosorbide mononitrate ER (Imdur) 120 mg 24 hr tablet 2/28/2024 at am      Sig: Take 1 tablet (120 mg) by mouth once daily in the morning.   multivit-mins/iron/folic/lycop (CENTRUM MEN ORAL) 2/28/2024 at am      Sig: Take 1 tablet by mouth once daily.   nitroglycerin (Nitrostat) 0.4 mg SL tablet       Sig: Place 1 tablet (0.4 mg) under the tongue if needed.   omega-3 1,000 mg capsule capsule 2/28/2024 at am      Sig: Take 1 tablet by mouth once daily.   spironolactone (Aldactone) 50 mg tablet 2/28/2024 at am      Sig: Take 1 tablet (50 mg) by mouth once daily.   tamsulosin  (Flomax) 0.4 mg 24 hr capsule 2/28/2024 at am      Sig: TAKE 1 CAPSULE BY MOUTH EVERY DAY 1/2 HOUR FOLLOWING THE SAME MEAL EACH DAY      Facility-Administered Medications: None      Spoke to the patient. Who had brought in his bottles..  Patient takes ASA daily. Patient was Prescribed Xarelto, hasn't taken for about 4 mos ever since his surgery and had to stop, he just never resumed.  Patient no longer takes Trazodone, Pot Chloride, gabapentin, and Inspra   Patient took his morning meds          The list below reflectives the updated allergy list. Please review each documented allergy for additional clarification and justification.  Allergies  Reviewed by Apurva Conrad RN on 2/28/2024        Severity Reactions Comments    Lisinopril Not Specified Cough     Valsartan Not Specified Cough, Other wheezing            Below are additional concerns with the patient's PTA list.      Kelsi Manuel CPhT

## 2024-02-28 NOTE — CONSULTS
"Inpatient consult to Cardiology  Consult performed by: Junaid Khanna DO  Consult ordered by: Sondra Gipson MD  Reason for consult: Chest Pain  Assessment/Recommendations: ACS Care   -- Load aspirin -- Completed.  -- Begin Heparin Gtt - Hanging   -- Begin Nitroglycerin Gtt ( Ordered )   -- NPO for Cath Lab today   -- Stat Echo ( Ordered)   -- Trend Troponin / Obtain HBA1c, Lipid Panel , BNP  -- Hold on initiation of Ticagrelor at this time given Hx of Known Multi-Vessel Disease   -- High Intensity Statin    Will need to resume Oral Anticoagulation after completion of Cath Lab for management of Atrial Fibrillation / Flutter Hx    Will need Resumption of GDMT for Systolic Heart Failure care.   -- This will occur after echocardiogram and Completion of Cath        History Of Present Illness:    Marco Henderson III is a 68 y.o. male presenting with a pertinent medical history for coronary artery disease post PCI x 2 ( RCA Intervention X 2 ;status post  Cypher stent in the proximal RCA 12/2005 (3.5 X 13 mm), Taxus stent in the mid RCA 7/2007 (3.0 X 20 mm), history of typical atrial flutter status post ablation 10/2022 with [id number]  Score at least 4, not on anticoagulation as of roughly November 2023 secondary to Pain Injection, never restarted, history of chronic heart failure with reduced ejection fraction 45% per last echocardiogram 10/2022 at [facility], Not on GDMT  diabetes mellitus type 2 on oral medications, essential hypertension who presented to the emergency department with chest discomfort.  Cardiology has been consulted for \"Chest Pain\"  A full hospital course review was completed.      Patient seen and examined in ER bed 20 after STEMI activation.  He is seen in the presence of his ex-wife.  Orts that he has been in his usual state of health.  He reports compliance with medications.  Noted that he started to have left arm, left-sided chest discomfort as well as heaviness " "over the last several days.  Because of worsening symptoms, similar to prior coronary events, presented to the ER    In the ER, on arrival he was afebrile at 36.9, heart rate 79 blood pressure 160/96 respiratory rate 18 saturating 98% on room air.  At that time, he was complaining of severe chest pain that was significantly reduced following administration of 1 nitroglycerin tablet.  Presenting EKG shows sinus rhythm left bundle branch block morphology but no criteria met for acute STEMI in the setting of left bundle branch block.  Historically, per [facility] chart review, this is known to be his baseline EKG.    Patient reports improvement in chest pain and angina.  Pt denies shortness of breath, dyspnea on exertion, orthopnea, and paroxysmal nocturnal dyspnea.  Pt denies worsening lower extremity edema.  Pt denies palpitations or syncope.  No recent falls.  No fever or chills.  No cough.  No change in bowel or bladder habits.  No travel.  No sick contacts.  No recent travel    12 point review of systems was performed and is otherwise negative.  Past medical history:  As above.  Medications:  Reviewed.  Allergies:  Reviewed.  Social history:  Patient denies smoking, alcohol abuse, or illicit drug use.  Family history:  No sudden cardiac death or premature coronary artery disease.       Last Recorded Vitals:  Vitals:    02/28/24 0902 02/28/24 0948 02/28/24 0959 02/28/24 1011   BP: (!) 160/96 (!) 138/99  136/78   BP Location: Left arm      Pulse: 79 74 70 69   Resp: 18 18 16 18   Temp: 36.9 °C (98.4 °F)      TempSrc: Temporal      SpO2: 98% 98%  99%   Weight: 84.8 kg (187 lb)      Height: 1.727 m (5' 8\")          Last Labs:  Results from last 7 days   Lab Units 02/28/24  0942   WBC AUTO x10*3/uL 5.1   HEMOGLOBIN g/dL 12.7*   HEMATOCRIT % 38.4*   PLATELETS AUTO x10*3/uL 219     Results from last 7 days   Lab Units 02/28/24  0942   SODIUM mmol/L 138   POTASSIUM mmol/L 3.5   CHLORIDE mmol/L 105   CO2 mmol/L 25   BUN " "mg/dL 20   CREATININE mg/dL 1.04   CALCIUM mg/dL 8.9   PROTEIN TOTAL g/dL 7.3   BILIRUBIN TOTAL mg/dL 0.7   ALK PHOS U/L 66   ALT U/L 15   AST U/L 15   GLUCOSE mg/dL 117*             Troponin I, High Sensitivity   Date/Time Value Ref Range Status   02/28/2024 09:42 AM 14 0 - 20 ng/L Final     Hemoglobin A1C   Date/Time Value Ref Range Status   07/15/2020 09:39 PM 4.9 4.3 - 5.6 % Final     Comment:     American Diabetes Association guidelines indicate that patients with HgbA1c in   the range 5.7-6.4% are at increased risk for development of diabetes, and   intervention by lifestyle modification may be beneficial. HgbA1c greater or   equal to 6.5% is considered diagnostic of diabetes.      Last I/O:  No intake/output data recorded.    Past Cardiology Tests (Last 3 Years):  EKG:  -- Sinus rhythm at 77 bpm with LVH/ Left Bundle Morphology with BiFid P-Waves   -- Similar in appearance to EKG Below        [facility] EKG 10/2022         Echo:  2/2019   - The left ventricle is mildly dilated. Left ventricular systolic function is   mildly decreased. EF = 50 ± 5% (visual est.) Grade II left ventricular diastolic   dysfunction.   - The right ventricle is normal in size. Right ventricular systolic function is   normal.   - The left atrial cavity is moderately dilated.   - The right atrial cavity is mildly dilated.   - Estimated right ventricular systolic pressure is not reported due to an   insufficient tricuspid regurgitation signal. Estimated right atrial pressure is   not included as the IVC was not seen.   - Exam was compared with the prior  echocardiographic exam performed on     Ejection Fractions:  No results found for: \"EF\"  Cath:  2009 1.  Coronary anatomy:                   -    Left main trunk: The left main trunk is a normal vessel.                   -    Left anterior descending:  There is a 60% stenosis in a large        (>3.0mm) mid left anterior descending.                   -    Left circumflex: The " left circumflex has mild non-obstructive        stenosis and is a non-dominant vessel.                   2. The left main trunk appears to be angiographically normal. The left        anterior descending artery has a 60-70% lesion in its mid portion just        before the origin of the second diagonal branch. There is mild disease        distally. The left circumflex artery is large, dominant, and gives rise        to a small first obtuse marginal, and a medium-sized second obtuse        marginal branch. There are luminal irregularities. The right coronary        artery is medium to large-sized, and has widely patent stents in its        proximal and mid portions. There is mild angiographic disease.                 FFR interrogation of the lesion in the mid LAD with a RADI wire yielded        a lowest value of 0.82.    s.    Stress Test:  [facility] study 2018   1. SPECT Perfusion Study: No evidence of ischemia or infarction.    2. A mild fixed reduction in observed activity in the inferior wall   exhibits function which is comparable to other ventricular regions and is   considered to reflect attenuation artifact.    3. Functional capacity N/A (pharmacological).    4. Left ventricle is moderately dilated. The left ventricle systolic   function is moderately decreased.    5. Right ventricle is mildly dilated. The right ventricle systolic   function is mildly decreased.    6. This is an intermediate risk scan, due to the observed reduction in   LVEF. .    Cardiac Imaging:  No results found for this or any previous visit from the past 1095 days.      Past Medical History:      Past Surgical History:  He has no past surgical history on file.      Social History:  Reports occasional tobacco use in the form of cigars, occasional alcohol use, denies drug use.  -- Reported past use of crack cocaine    Family History:  No family history on file.     Allergies:  Lisinopril and Valsartan    Inpatient Medications:  Scheduled  medications   Medication Dose Route Frequency    aspirin  324 mg oral Once     PRN medications   Medication    aspirin    aspirin    heparin    nitroglycerin    nitroglycerin     Continuous Medications   Medication Dose Last Rate    heparin  0-4,000 Units/hr 1,000 Units/hr (02/28/24 0951)    nitroglycerin  5-200 mcg/min       Outpatient Medications:  Current Outpatient Medications   Medication Instructions    allopurinol (Zyloprim) 300 mg tablet 1 tablet, oral, Daily    aspirin 81 mg chewable tablet 1 tablet, oral, Daily    atorvastatin (Lipitor) 40 mg tablet 1 tablet, oral, Nightly    blood sugar diagnostic strip Use as instructed    carvedilol (Coreg) 25 mg tablet 1 tablet, oral, 2 times daily with meals    cyclobenzaprine (FLEXERIL) 10 mg, oral, 2 times daily PRN    DOCOSAHEXAENOIC ACID ORAL 1 capsule, oral    eplerenone (INSPRA) 25 mg, oral, Daily RT    eplerenone (INSPRA) 50 mg, oral, Daily RT    Farxiga 10 mg 1 tablet, oral, Every morning    ferrous gluconate (FERGON) 324 mg, oral, Daily RT    fluticasone (Flonase) 50 mcg/actuation nasal spray 2 sprays, Each Nostril, Daily RT    furosemide (Lasix) 20 mg tablet 1 tablet, oral, Daily    furosemide (LASIX) 40 mg, oral, 2 times daily    gabapentin (Neurontin) 300 mg capsule Day 1 Take 300mg at bedtime(one pill), Day 7 600mg at bedtime, (two pills)    hydrALAZINE (Apresoline) 100 mg tablet 1 tablet, oral, 3 times daily with meals    hydrALAZINE (APRESOLINE) 100 mg, oral, 3 times daily with meals    ibuprofen 800 mg, oral, Every 8 hours PRN    isosorbide dinitrate (ISORDIL) 30 mg, oral, 2 times daily    isosorbide mononitrate ER (IMDUR) 120 mg, oral, Every morning    Klor-Con M10 10 mEq ER tablet 1 tablet, oral, Daily    lidocaine (Lidoderm) 5 % patch APPLY TWO PATCHES TO CLEAN DRY SKIN EVERY DAY FOR LEG PAIN.  PATCH SHOULD REMAIN ON SKIN NO LONGER THAN 12 HOURS IN ANY 24 HOUR PERIOD.    magnesium oxide (Mag-Ox) 420 mg tablet 1 tablet, oral, 2 times daily     magnesium oxide (MAG-OX) 400 mg, oral, Daily RT    melatonin 3 mg, oral, Nightly PRN    multivit-mins/iron/folic/lycop (CENTRUM MEN ORAL) 1 tablet, oral    nitroglycerin (NITROSTAT) 0.4 mg, sublingual, As needed    omega-3 1,000 mg capsule capsule 1 tablet, oral, Daily RT    potassium chloride CR 20 mEq ER tablet 20 mEq, oral, Daily RT    spironolactone (ALDACTONE) 50 mg, oral, Daily    tamsulosin (Flomax) 0.4 mg 24 hr capsule TAKE 1 CAPSULE BY MOUTH EVERY DAY 1/2 HOUR FOLLOWING THE SAME MEAL EACH DAY    traZODone (Desyrel) 50 mg tablet 1 tablet, oral, Nightly    Xarelto 20 mg tablet 1 tablet, oral, Daily with evening meal       Physical Exam:  Vitals reviewed.   Constitutional:       Appearance: Normal appearance. Well-developed.      Comments: Patient seen and examined in ACU bed 20.  He is resting comfortably in bed.  He is in no acute distress.  He reports that his chest pain is dramatically improved.   Eyes:      Pupils: Pupils are equal, round, and reactive to light.   Neck:      Thyroid: Thyroid normal.      Vascular: JVD normal.   Pulmonary:      Effort: Pulmonary effort is normal.      Breath sounds: Normal breath sounds. No wheezing. No rhonchi. No rales.   Chest:      Chest wall: Not tender to palpatation.   Cardiovascular:      Normal rate. Regular rhythm. Normal S1. Normal S2.       Murmurs: There is no murmur.      No gallop.  No click. No rub.   Pulses:     Intact distal pulses.   Edema:     Peripheral edema absent.   Abdominal:      General: Bowel sounds are normal.   Musculoskeletal: Normal range of motion. Skin:     General: Skin is warm and dry.   Neurological:      General: No focal deficit present.      Mental Status: Alert and oriented to person, place and time.   Psychiatric:         Behavior: Behavior is cooperative.            Assessment/Plan   Marco Henderson III is a 68 y.o. M presenting with a pertinent medical history for coronary artery disease post PCI x 2 ( RCA Intervention X 2 ;status  "post  Cypher stent in the proximal RCA 12/2005 (3.5 X 13 mm), Taxus stent in the mid RCA 7/2007 (3.0 X 20 mm), history of typical atrial flutter status post ablation 10/2022 with [id number]  Score at least 4, not on anticoagulation as of roughly November 2023 secondary to Pain Injection, never restarted, history of chronic heart failure with reduced ejection fraction 45% per last echocardiogram 10/2022 at [facility], Not on GDMT  diabetes mellitus type 2 on oral medications, essential hypertension who presented to the emergency department with chest discomfort.  Cardiology has been consulted for \"Chest Pain\"  I personally reviewed the patient's recent labs, medications, orders, EKGs, and pertinent cardiac imaging/ echocardiography.      ACS Care   -- Load aspirin -- Completed.  -- Begin Heparin Gtt - Hanging   -- Begin Nitroglycerin Gtt ( Ordered )   -- NPO for Cath Lab today   -- Stat Echo ( Ordered)   -- Trend Troponin / Obtain HBA1c, Lipid Panel , BNP  -- Hold on initiation of Ticagrelor at this time given Hx of Known Multi-Vessel Disease   -- High Intensity Statin    Will need to resume Oral Anticoagulation after completion of Cath Lab for management of Atrial Fibrillation / Flutter Hx  -- Historically has been on Rivaroxaban 20 mg Daily     Will need Resumption of GDMT for Systolic Heart Failure care.   -- This will occur after echocardiogram and Completion of Cath  -- Historical GDMT:  ++ BB: Historically Carvedilol 25 mg BID   ++ ACE/ARB/ARNI: NONE -- Documented Intolerance to ACE and ARB with Swelling with both classes  ++ MRA: Historically Eplerenone 50 mg Daily   ++ SGLT2-inhibitor: Farxiga 10   ++ Diuretic Furosemide 40 mg BID   ++ Historically -Hydralazine 100 mg TID / Isosorbide Mononitrate 120 mg                 Peripheral IV 02/28/24 Right Antecubital (Active)   Site Assessment Clean;Dry;Intact 02/28/24 1009   Number of days: 0       Code Status:  No Order    I spent 75 minutes in " the professional and overall care of this patient.        Junaid Khanna DO   Clinical Cardology   [facility]  [facility]

## 2024-02-28 NOTE — PROGRESS NOTES
Transitional Care Coordination Progress Note:  Plan per Medical/Surgical team: treatment of CP, MI with heparin gtt, nitroglycerin gtt, cardio consult, ECHO pending  Status: ED  Payor source: [employer], [place]  Discharge disposition: Home with wife   Potential Barriers: trops pending  ADOD: 3/1/2024  ARPIT Byrd RN, BSN Transitional Care Coordinator ED# [phone]      02/28/24 1017   Discharge Planning   Living Arrangements Spouse/significant other   Support Systems Spouse/significant other   Assistance Needed cardio work up   Type of Residence Private residence   Number of Stairs to Enter Residence 0   Number of Stairs Within Residence 0   Home or Post Acute Services None   Patient expects to be discharged to: Home with wife   Does the patient need discharge transport arranged? Yes   RoundTrip coordination needed? Yes   Has discharge transport been arranged? No   Financial Resource Strain   How hard is it for you to pay for the very basics like food, housing, medical care, and heating? Not hard   Housing Stability   In the last 12 months, was there a time when you were not able to pay the mortgage or rent on time? N   In the last 12 months, how many places have you lived? 1   In the last 12 months, was there a time when you did not have a steady place to sleep or slept in a shelter (including now)? N   Transportation Needs   In the past 12 months, has lack of transportation kept you from medical appointments or from getting medications? no   In the past 12 months, has lack of transportation kept you from meetings, work, or from getting things needed for daily living? No

## 2024-02-28 NOTE — PROGRESS NOTES
02/28/24 1017   [facility] Disability Status   Are you deaf or do you have serious difficulty hearing? N   Are you blind or do you have serious difficulty seeing, even when wearing glasses? N   Because of a physical, mental, or emotional condition, do you have serious difficulty concentrating, remembering, or making decisions? (5 years old or older) N   Do you have serious difficulty walking or climbing stairs? N   Do you have serious difficulty dressing or bathing? N   Because of a physical, mental, or emotional condition, do you have serious difficulty doing errands alone such as visiting the doctor? N

## 2024-02-28 NOTE — Clinical Note
Inflation 1: Pressure = 8 colton; Duration = 10 sec. Inflation 2: Pressure = 8 colton; Duration = 10 sec. Inflation 3: Pressure = 6 colton; Duration = 3 sec.

## 2024-02-28 NOTE — Clinical Note
Angioplasty of the left anterior descending lesion. Inflation 1: Pressure = 18 colton; Duration = 15 sec. Inflation 2: Pressure = 18 colton; Duration = 5 sec.

## 2024-02-28 NOTE — TREATMENT PLAN
Patient without focal wall motion changes on echocardiogram, normal troponin in the setting of chest pain and unchanged EKG.  His chest pain is resolved   -- Continue Heparin at this time   -- Continue NTG Gtt   -- Ok for cardiac diet   -- NPO Midnight   -- For Cath 2/29/2024    Reviewed and approved by LILLIAM GAUTHIER on 2/28/24 at 2:04 PM.

## 2024-02-28 NOTE — H&P
History Of Present Illness  Marco Henderson III is a 68 y.o. male past medical history of hypertension, CAD, MI status post stents in 2005, chronic systolic heart failure, a flutter status post ablation patient reports he is currently been off his Xarelto for 4 months for back operation, type 2 diabetes presenting with complaints of chest pain.  Patient states over the last 2 days he has been experiencing midsternal chest pain radiating to his left arm.  He initially thought it was musculoskeletal so he placed a pain patch, however it did not improve which prompted him to come to the emergency department.  Patient states that pain worsens with exertion.  Patient denies fever, chills, shortness of breath, nausea vomiting or diarrhea.    In the emergency department patient blood glucose 117, sodium 138, potassium 3.5, BUN 20, creatinine 1.04, troponin 14 and 8 on repeat, white blood cell count 5.1, hemoglobin 12.7, hematocrit 38.4, platelets 219.  Chest x-ray negative.  Cardiology has been consulted patient be called to Cath Lab this afternoon.     Past Medical History  He has no past medical history on file.    Surgical History  He has no past surgical history on file.     Social History  He has no history on file for tobacco use, alcohol use, and drug use.    Family History  No family history on file.     Allergies  Lisinopril and Valsartan    Review of Systems   Constitutional:  Positive for activity change.   HENT: Negative.     Respiratory: Negative.     Cardiovascular:  Positive for chest pain.   Gastrointestinal: Negative.    Endocrine: Negative.    Genitourinary: Negative.    Musculoskeletal: Negative.    Neurological: Negative.    Hematological: Negative.    Psychiatric/Behavioral: Negative.          Physical Exam  HENT:      Head: Normocephalic.   Cardiovascular:      Rate and Rhythm: Normal rate and regular rhythm.   Abdominal:      General: Bowel sounds are normal.      Palpations: Abdomen is soft.    Musculoskeletal:         General: Normal range of motion.      Cervical back: Neck supple.   Skin:     General: Skin is warm.   Neurological:      Mental Status: He is alert and oriented to person, place, and time.   Psychiatric:         Mood and Affect: Mood normal.         Behavior: Behavior normal.          Last Recorded Vitals  /87   Pulse 68   Temp 36.9 °C (98.4 °F) (Temporal)   Resp 16   Wt 84.8 kg (187 lb)   SpO2 98%     Relevant Results      Results for orders placed or performed during the hospital encounter of 02/28/24 (from the past 24 hour(s))   CBC and Auto Differential   Result Value Ref Range    WBC 5.1 4.4 - 11.3 x10*3/uL    nRBC 0.0 0.0 - 0.0 /100 WBCs    RBC 4.18 (L) 4.50 - 5.90 x10*6/uL    Hemoglobin 12.7 (L) 13.5 - 17.5 g/dL    Hematocrit 38.4 (L) 41.0 - 52.0 %    MCV 92 80 - 100 fL    MCH 30.4 26.0 - 34.0 pg    MCHC 33.1 32.0 - 36.0 g/dL    RDW 13.2 11.5 - 14.5 %    Platelets 219 150 - 450 x10*3/uL    Neutrophils % 63.2 40.0 - 80.0 %    Immature Granulocytes %, Automated 0.2 0.0 - 0.9 %    Lymphocytes % 23.2 13.0 - 44.0 %    Monocytes % 8.9 2.0 - 10.0 %    Eosinophils % 3.9 0.0 - 6.0 %    Basophils % 0.6 0.0 - 2.0 %    Neutrophils Absolute 3.21 1.20 - 7.70 x10*3/uL    Immature Granulocytes Absolute, Automated 0.01 0.00 - 0.70 x10*3/uL    Lymphocytes Absolute 1.18 (L) 1.20 - 4.80 x10*3/uL    Monocytes Absolute 0.45 0.10 - 1.00 x10*3/uL    Eosinophils Absolute 0.20 0.00 - 0.70 x10*3/uL    Basophils Absolute 0.03 0.00 - 0.10 x10*3/uL   Comprehensive metabolic panel   Result Value Ref Range    Glucose 117 (H) 74 - 99 mg/dL    Sodium 138 136 - 145 mmol/L    Potassium 3.5 3.5 - 5.3 mmol/L    Chloride 105 98 - 107 mmol/L    Bicarbonate 25 21 - 32 mmol/L    Anion Gap 12 10 - 20 mmol/L    Urea Nitrogen 20 6 - 23 mg/dL    Creatinine 1.04 0.50 - 1.30 mg/dL    eGFR 77 >60 mL/min/1.73m*2    Calcium 8.9 8.6 - 10.3 mg/dL    Albumin 3.9 3.4 - 5.0 g/dL    Alkaline Phosphatase 66 33 - 136 U/L     Total Protein 7.3 6.4 - 8.2 g/dL    AST 15 9 - 39 U/L    Bilirubin, Total 0.7 0.0 - 1.2 mg/dL    ALT 15 10 - 52 U/L   Troponin I, High Sensitivity   Result Value Ref Range    Troponin I, High Sensitivity 14 0 - 20 ng/L   Transthoracic Echo (TTE) Complete   Result Value Ref Range    AV pk anita 1.51 m/s    AV mn grad 5.0 mmHg    LVOT diam 2.20 cm    LV biplane EF 55 %    MV avg E/e' ratio 9.60     MV E/A ratio 0.67     LA vol index A/L 44.2 ml/m2    Tricuspid annular plane systolic excursion 2.5 cm    LVIDd 5.25 cm    RVSP 32.4 mmHg    Aortic Valve Area by Continuity of VTI 2.10 cm2    Aortic Valve Area by Continuity of Peak Velocity 2.18 cm2    AV pk grad 9.1 mmHg    LV A4C EF 57.7     BSA 2.02 m2   Troponin I, High Sensitivity   Result Value Ref Range    Troponin I, High Sensitivity 8 0 - 20 ng/L          Assessment/Plan   Principal Problem:    Chest pain, unspecified type  Active Problems:    Atrial fibrillation (CMS/HCC)    Coronary atherosclerosis due to lipid rich plaque (CODE)    LBBB (left bundle branch block)  Assessment:  Chest pain    Plan:  Cardiology consult  Plan for heart cath this afternoon, will keep n.p.o.  Heparin drip initiated in the emergency department  Aspirin daily  Telemetry    History of a flutter  -Patient currently has been off his Xarelto for the last 4 months do to back operation and he just has not resumed, resumption of cardiology recs    CAD  -Continue aspirin and atorvastatin dose increased to 80 mg daily  -isosorbide    Chronic systolic heart failure  -Continue Farxiga 10 mg daily  -continue furosemide 20 mg daily  -Continue spironolactone 50 mg daily    Hypertension  -Continue hydralazine 100 mg 3 times daily  -Continue carvedilol 25 mg twice daily    Type 2 diabetes  -On Farxiga  -Monitor blood glucose    DVT prophylaxis-currently on heparin drip           KYUNG Armstrong-CNP

## 2024-02-28 NOTE — ED PROVIDER NOTES
HPI   Chief Complaint   Patient presents with    Chest Pain       HPI  Patient is a 68-year-old male with a past medical history significant for MI in 2005 status postcardiac stents, hypertension who presents emergency room with chief complaint of chest discomfort.  It started this morning it was mild in the center of his chest.  He had a little bit of radiation to the left arm but nothing significant.  He describes it as feeling like a heaviness.  This morning it became worse so he came for evaluation.  He has no diaphoresis, chest pain, shortness of breath.  He does not remember if it feels like his prior MI.  While here he ambulated to the bathroom at which time the pain became significantly worse radiating to the left arm.  He feels like it is a heaviness right now.      PMHx: As above  PSHx: As above  FamilyHx: Denies pertinent  SocialHx: Denies  Allergies: Lisinopril and valsartan  Medications: See Medication Reconciliation     ROS  As above but otherwise denies    Physical Exam    GENERAL: Awake and Alert but appears uncomfortable  HEENT: AT/NC, PERRL, EOMI, Normal Oropharynx, No Signs of Dehydration  NECK: Normal Inspection, No JVD  CARDIOVASCULAR: RRR, No M/R/G  RESPIRATORY: CTA Bilaterally, No Wheezes, Rales or Rhonchi, Chest Wall Non-tender  ABDOMEN: Soft, non-tender abdomen, Normal Bowel Sounds, No Distention  BACK: No CVA Tenderness  SKIN: Normal Color, Warm, Dry, No Rashes   EXTREMITIES: Non-Tender, Full ROM, No Pedal Edema  NEURO: A&O x 3, Normal Motor and Sensation, Normal Mood and Affect    Nursing Assessment and Vitals Reviewed    EKG showed a normal sinus rhythm 77 bpm.  There are no ischemic ST changes.  There is signs of LVH.  There are T wave inversions in lead I, aVL and II.  There are also T wave inversions in lead V5 and V6.    Repeat EKG this morning performed after patient pain worsened significantly showed ST elevation in V1 and V2 to some degree with some ST depression in lead II.  He is  still a sinus rhythm now with marked sinus arrhythmia at 65 bpm.  There are findings concerning for left bundle branch block.  T wave inversions in 1, aVL, V5 and V6.  Third EKG similar to prior at 67 bpm.    Her EKGs are available for for comparison.    Medical Decision  Patient is a 68-year-old male with a past medical history significant for MI in 2005 status postcardiac stents, hypertension who presents emergency room with chief complaint of chest discomfort.  It started this morning it was mild in the center of his chest.  He had a little bit of radiation to the left arm but nothing significant.  He describes it as feeling like a heaviness.  This morning it became worse so he came for evaluation.  He has no diaphoresis, chest pain, shortness of breath.  He does not remember if it feels like his prior MI.  While here he ambulated to the bathroom at which time the pain became significantly worse radiating to the left arm.  He feels like it is a heaviness right now.    Initial EKG as above without ST changes, however, while in the ED patient pain became significantly worse and he looked and distressed so repeat EKG was ordered.  It did show some ST findings concerning for possible developing elevation in V1 and a little in V2 with now depression in lead II.  A STEMI alert was called and patient was discussed with Dr. Sotelo who reviewed EKGs.  Per his request STEMI alert was canceled but patient was started on heparinization, aspirin.  He was also given nitro sublingual with some improvement.  While in the ED the pain did return and patient was placed on nitroglycerin drip per recommendation from Dr. Khanna, cardiology who evaluated patient at bedside.  Plan for Cath Lab later today.  Patient remains in stable condition and initial troponin was 14.  He is admitted to stepdown unit for further workup and management.                          No data recorded                   Patient History   No past medical  history on file.  No past surgical history on file.  No family history on file.  Social History     Tobacco Use    Smoking status: Not on file    Smokeless tobacco: Not on file   Substance Use Topics    Alcohol use: Not on file    Drug use: Not on file       Physical Exam   ED Triage Vitals [02/28/24 0902]   Temperature Heart Rate Respirations BP   36.9 °C (98.4 °F) 79 18 (!) 160/96      Pulse Ox Temp Source Heart Rate Source Patient Position   98 % Temporal -- --      BP Location FiO2 (%)     Left arm --       Physical Exam    ED Course & MDM   Diagnoses as of 02/28/24 1047   Chest pain, unspecified type       Medical Decision Making      Procedure  Procedures     Sondra Gipson MD  02/28/24 1047

## 2024-02-28 NOTE — Clinical Note
Inflation 1: Pressure = 20 colton; Duration = 5 sec. Inflation 2: Pressure = 20 colton; . Inflation 3: Pressure = 25 colton; . Inflation 4: Pressure = 20 colton; .

## 2024-02-28 NOTE — ED TRIAGE NOTES
Pt arrived via EMS from home with chief c/o of L arm pain/L sided CP for the last several days. Pt endorses feeling heaviness in his chest. He states he came today because the pain in his arm was getting worse. Pt has hx of heart failure, on lasix, used to be on xarelto but states it was discontinued last year. He denies SOB, dizziness, syncope. He states he took all of his morning meds today including his 81mg ASA. Vital signs stable on arrival.

## 2024-02-28 NOTE — PROGRESS NOTES
Home with wife      02/28/24 1016   Current Planned Discharge Disposition   Current Planned Discharge Disposition Home

## 2024-02-29 ENCOUNTER — APPOINTMENT (OUTPATIENT)
Dept: CARDIOLOGY | Facility: HOSPITAL | Age: 69
DRG: 321 | End: 2024-02-29
Payer: MEDICARE

## 2024-02-29 LAB
ANION GAP SERPL CALC-SCNC: 11 MMOL/L (ref 10–20)
ATRIAL RATE: 65 BPM
ATRIAL RATE: 67 BPM
ATRIAL RATE: 67 BPM
ATRIAL RATE: 77 BPM
BUN SERPL-MCNC: 18 MG/DL (ref 6–23)
CALCIUM SERPL-MCNC: 8.8 MG/DL (ref 8.6–10.3)
CHLORIDE SERPL-SCNC: 102 MMOL/L (ref 98–107)
CHOLEST SERPL-MCNC: 121 MG/DL (ref 0–199)
CHOLESTEROL/HDL RATIO: 2
CO2 SERPL-SCNC: 27 MMOL/L (ref 21–32)
CREAT SERPL-MCNC: 1.1 MG/DL (ref 0.5–1.3)
EGFRCR SERPLBLD CKD-EPI 2021: 72 ML/MIN/1.73M*2
EJECTION FRACTION APICAL 4 CHAMBER: 41.6
EJECTION FRACTION: 43 %
ERYTHROCYTE [DISTWIDTH] IN BLOOD BY AUTOMATED COUNT: 13.3 % (ref 11.5–14.5)
EST. AVERAGE GLUCOSE BLD GHB EST-MCNC: 91 MG/DL
GLUCOSE BLD MANUAL STRIP-MCNC: 107 MG/DL (ref 74–99)
GLUCOSE BLD MANUAL STRIP-MCNC: 172 MG/DL (ref 74–99)
GLUCOSE SERPL-MCNC: 119 MG/DL (ref 74–99)
HBA1C MFR BLD: 4.8 %
HCT VFR BLD AUTO: 39.2 % (ref 41–52)
HDLC SERPL-MCNC: 59.8 MG/DL
HGB BLD-MCNC: 12.8 G/DL (ref 13.5–17.5)
HOLD SPECIMEN: NORMAL
LDLC SERPL CALC-MCNC: 44 MG/DL
MCH RBC QN AUTO: 30.5 PG (ref 26–34)
MCHC RBC AUTO-ENTMCNC: 32.7 G/DL (ref 32–36)
MCV RBC AUTO: 93 FL (ref 80–100)
NON HDL CHOLESTEROL: 61 MG/DL (ref 0–149)
NRBC BLD-RTO: 0 /100 WBCS (ref 0–0)
P AXIS: 60 DEGREES
P AXIS: 66 DEGREES
P AXIS: 67 DEGREES
P AXIS: 75 DEGREES
P OFFSET: 184 MS
P OFFSET: 186 MS
P OFFSET: 186 MS
P OFFSET: 187 MS
P ONSET: 129 MS
P ONSET: 130 MS
P ONSET: 130 MS
P ONSET: 134 MS
PLATELET # BLD AUTO: 211 X10*3/UL (ref 150–450)
POTASSIUM SERPL-SCNC: 3.8 MMOL/L (ref 3.5–5.3)
PR INTERVAL: 166 MS
PR INTERVAL: 172 MS
PR INTERVAL: 172 MS
PR INTERVAL: 174 MS
Q ONSET: 216 MS
Q ONSET: 217 MS
QRS COUNT: 11 BEATS
QRS COUNT: 13 BEATS
QRS DURATION: 132 MS
QRS DURATION: 134 MS
QRS DURATION: 134 MS
QRS DURATION: 136 MS
QT INTERVAL: 406 MS
QT INTERVAL: 420 MS
QT INTERVAL: 424 MS
QT INTERVAL: 434 MS
QTC CALCULATION(BAZETT): 440 MS
QTC CALCULATION(BAZETT): 443 MS
QTC CALCULATION(BAZETT): 458 MS
QTC CALCULATION(BAZETT): 459 MS
QTC FREDERICIA: 435 MS
QTC FREDERICIA: 436 MS
QTC FREDERICIA: 441 MS
QTC FREDERICIA: 450 MS
R AXIS: -14 DEGREES
R AXIS: -21 DEGREES
R AXIS: -25 DEGREES
R AXIS: -43 DEGREES
RBC # BLD AUTO: 4.2 X10*6/UL (ref 4.5–5.9)
SODIUM SERPL-SCNC: 136 MMOL/L (ref 136–145)
T AXIS: 157 DEGREES
T AXIS: 162 DEGREES
T AXIS: 180 DEGREES
T AXIS: 182 DEGREES
T OFFSET: 420 MS
T OFFSET: 426 MS
T OFFSET: 428 MS
T OFFSET: 433 MS
TRIGL SERPL-MCNC: 87 MG/DL (ref 0–149)
UFH PPP CHRO-ACNC: 0.3 IU/ML
VENTRICULAR RATE: 65 BPM
VENTRICULAR RATE: 67 BPM
VENTRICULAR RATE: 67 BPM
VENTRICULAR RATE: 77 BPM
VLDL: 17 MG/DL (ref 0–40)
WBC # BLD AUTO: 5.3 X10*3/UL (ref 4.4–11.3)

## 2024-02-29 PROCEDURE — 83036 HEMOGLOBIN GLYCOSYLATED A1C: CPT | Mod: AHULAB | Performed by: NURSE PRACTITIONER

## 2024-02-29 PROCEDURE — C9600 PERC DRUG-EL COR STENT SING: HCPCS | Performed by: HOSPITALIST

## 2024-02-29 PROCEDURE — 80048 BASIC METABOLIC PNL TOTAL CA: CPT | Performed by: NURSE PRACTITIONER

## 2024-02-29 PROCEDURE — 99222 1ST HOSP IP/OBS MODERATE 55: CPT | Performed by: NURSE PRACTITIONER

## 2024-02-29 PROCEDURE — 92928 PRQ TCAT PLMT NTRAC ST 1 LES: CPT | Performed by: HOSPITALIST

## 2024-02-29 PROCEDURE — C1753 CATH, INTRAVAS ULTRASOUND: HCPCS | Performed by: HOSPITALIST

## 2024-02-29 PROCEDURE — 85520 HEPARIN ASSAY: CPT | Performed by: NURSE PRACTITIONER

## 2024-02-29 PROCEDURE — B2111ZZ FLUOROSCOPY OF MULTIPLE CORONARY ARTERIES USING LOW OSMOLAR CONTRAST: ICD-10-PCS | Performed by: HOSPITALIST

## 2024-02-29 PROCEDURE — 82947 ASSAY GLUCOSE BLOOD QUANT: CPT

## 2024-02-29 PROCEDURE — 93571 IV DOP VEL&/PRESS C FLO 1ST: CPT | Performed by: HOSPITALIST

## 2024-02-29 PROCEDURE — C1894 INTRO/SHEATH, NON-LASER: HCPCS | Performed by: HOSPITALIST

## 2024-02-29 PROCEDURE — 2500000004 HC RX 250 GENERAL PHARMACY W/ HCPCS (ALT 636 FOR OP/ED): Performed by: HOSPITALIST

## 2024-02-29 PROCEDURE — 2500000001 HC RX 250 WO HCPCS SELF ADMINISTERED DRUGS (ALT 637 FOR MEDICARE OP): Performed by: NURSE PRACTITIONER

## 2024-02-29 PROCEDURE — 99153 MOD SED SAME PHYS/QHP EA: CPT | Performed by: HOSPITALIST

## 2024-02-29 PROCEDURE — 80061 LIPID PANEL: CPT | Performed by: NURSE PRACTITIONER

## 2024-02-29 PROCEDURE — 2500000004 HC RX 250 GENERAL PHARMACY W/ HCPCS (ALT 636 FOR OP/ED): Performed by: NURSE PRACTITIONER

## 2024-02-29 PROCEDURE — C1769 GUIDE WIRE: HCPCS | Performed by: HOSPITALIST

## 2024-02-29 PROCEDURE — 2720000007 HC OR 272 NO HCPCS: Performed by: HOSPITALIST

## 2024-02-29 PROCEDURE — 2500000002 HC RX 250 W HCPCS SELF ADMINISTERED DRUGS (ALT 637 FOR MEDICARE OP, ALT 636 FOR OP/ED): Performed by: HOSPITALIST

## 2024-02-29 PROCEDURE — 2020000001 HC ICU ROOM DAILY

## 2024-02-29 PROCEDURE — 93454 CORONARY ARTERY ANGIO S&I: CPT | Performed by: HOSPITALIST

## 2024-02-29 PROCEDURE — 2500000005 HC RX 250 GENERAL PHARMACY W/O HCPCS: Performed by: NURSE PRACTITIONER

## 2024-02-29 PROCEDURE — 99233 SBSQ HOSP IP/OBS HIGH 50: CPT | Performed by: INTERNAL MEDICINE

## 2024-02-29 PROCEDURE — B221Z2Z COMPUTERIZED TOMOGRAPHY (CT SCAN) OF MULTIPLE CORONARY ARTERIES USING INTRAVASCULAR OPTICAL COHERENCE: ICD-10-PCS | Performed by: HOSPITALIST

## 2024-02-29 PROCEDURE — 99152 MOD SED SAME PHYS/QHP 5/>YRS: CPT | Performed by: HOSPITALIST

## 2024-02-29 PROCEDURE — 93005 ELECTROCARDIOGRAM TRACING: CPT

## 2024-02-29 PROCEDURE — 2500000001 HC RX 250 WO HCPCS SELF ADMINISTERED DRUGS (ALT 637 FOR MEDICARE OP): Performed by: INTERNAL MEDICINE

## 2024-02-29 PROCEDURE — 85027 COMPLETE CBC AUTOMATED: CPT | Performed by: NURSE PRACTITIONER

## 2024-02-29 PROCEDURE — C1725 CATH, TRANSLUMIN NON-LASER: HCPCS | Performed by: HOSPITALIST

## 2024-02-29 PROCEDURE — 027034Z DILATION OF CORONARY ARTERY, ONE ARTERY WITH DRUG-ELUTING INTRALUMINAL DEVICE, PERCUTANEOUS APPROACH: ICD-10-PCS | Performed by: HOSPITALIST

## 2024-02-29 PROCEDURE — 4A023N8 MEASUREMENT OF CARDIAC SAMPLING AND PRESSURE, BILATERAL, PERCUTANEOUS APPROACH: ICD-10-PCS | Performed by: HOSPITALIST

## 2024-02-29 PROCEDURE — C1760 CLOSURE DEV, VASC: HCPCS | Performed by: HOSPITALIST

## 2024-02-29 PROCEDURE — 4A033BC MEASUREMENT OF ARTERIAL PRESSURE, CORONARY, PERCUTANEOUS APPROACH: ICD-10-PCS | Performed by: HOSPITALIST

## 2024-02-29 PROCEDURE — 93308 TTE F-UP OR LMTD: CPT

## 2024-02-29 PROCEDURE — C1874 STENT, COATED/COV W/DEL SYS: HCPCS | Performed by: HOSPITALIST

## 2024-02-29 PROCEDURE — 92978 ENDOLUMINL IVUS OCT C 1ST: CPT | Performed by: HOSPITALIST

## 2024-02-29 PROCEDURE — 93308 TTE F-UP OR LMTD: CPT | Performed by: INTERNAL MEDICINE

## 2024-02-29 PROCEDURE — 2500000002 HC RX 250 W HCPCS SELF ADMINISTERED DRUGS (ALT 637 FOR MEDICARE OP, ALT 636 FOR OP/ED): Performed by: NURSE PRACTITIONER

## 2024-02-29 PROCEDURE — 36415 COLL VENOUS BLD VENIPUNCTURE: CPT | Performed by: NURSE PRACTITIONER

## 2024-02-29 PROCEDURE — 85347 COAGULATION TIME ACTIVATED: CPT

## 2024-02-29 PROCEDURE — 2500000002 HC RX 250 W HCPCS SELF ADMINISTERED DRUGS (ALT 637 FOR MEDICARE OP, ALT 636 FOR OP/ED): Performed by: INTERNAL MEDICINE

## 2024-02-29 PROCEDURE — 2500000005 HC RX 250 GENERAL PHARMACY W/O HCPCS: Performed by: HOSPITALIST

## 2024-02-29 PROCEDURE — C1887 CATHETER, GUIDING: HCPCS | Performed by: HOSPITALIST

## 2024-02-29 PROCEDURE — 2780000003 HC OR 278 NO HCPCS: Performed by: HOSPITALIST

## 2024-02-29 DEVICE — STENT ONYXNG35030UX ONYX 3.50X30RX
Type: IMPLANTABLE DEVICE | Site: ARTERIAL | Status: FUNCTIONAL
Brand: ONYX FRONTIER™

## 2024-02-29 RX ORDER — HEPARIN SODIUM 1000 [USP'U]/ML
INJECTION, SOLUTION INTRAVENOUS; SUBCUTANEOUS AS NEEDED
Status: DISCONTINUED | OUTPATIENT
Start: 2024-02-29 | End: 2024-03-01 | Stop reason: HOSPADM

## 2024-02-29 RX ORDER — HYDRALAZINE HYDROCHLORIDE 20 MG/ML
INJECTION INTRAMUSCULAR; INTRAVENOUS AS NEEDED
Status: DISCONTINUED | OUTPATIENT
Start: 2024-02-29 | End: 2024-03-01 | Stop reason: HOSPADM

## 2024-02-29 RX ORDER — SODIUM CHLORIDE 9 MG/ML
100 INJECTION, SOLUTION INTRAVENOUS CONTINUOUS
Status: DISCONTINUED | OUTPATIENT
Start: 2024-02-29 | End: 2024-02-29

## 2024-02-29 RX ORDER — MIDAZOLAM HYDROCHLORIDE 1 MG/ML
INJECTION, SOLUTION INTRAMUSCULAR; INTRAVENOUS AS NEEDED
Status: DISCONTINUED | OUTPATIENT
Start: 2024-02-29 | End: 2024-03-01 | Stop reason: HOSPADM

## 2024-02-29 RX ORDER — ACETAMINOPHEN 325 MG/1
650 TABLET ORAL EVERY 6 HOURS PRN
Status: DISCONTINUED | OUTPATIENT
Start: 2024-02-29 | End: 2024-03-02 | Stop reason: HOSPADM

## 2024-02-29 RX ORDER — LIDOCAINE HYDROCHLORIDE 10 MG/ML
INJECTION, SOLUTION EPIDURAL; INFILTRATION; INTRACAUDAL; PERINEURAL AS NEEDED
Status: DISCONTINUED | OUTPATIENT
Start: 2024-02-29 | End: 2024-03-01 | Stop reason: HOSPADM

## 2024-02-29 RX ORDER — FENTANYL CITRATE 50 UG/ML
INJECTION, SOLUTION INTRAMUSCULAR; INTRAVENOUS AS NEEDED
Status: DISCONTINUED | OUTPATIENT
Start: 2024-02-29 | End: 2024-03-01 | Stop reason: HOSPADM

## 2024-02-29 RX ORDER — ACETAMINOPHEN 160 MG/5ML
650 SOLUTION ORAL EVERY 6 HOURS PRN
Status: DISCONTINUED | OUTPATIENT
Start: 2024-02-29 | End: 2024-03-02 | Stop reason: HOSPADM

## 2024-02-29 RX ORDER — ACETAMINOPHEN 650 MG/1
650 SUPPOSITORY RECTAL EVERY 6 HOURS PRN
Status: DISCONTINUED | OUTPATIENT
Start: 2024-02-29 | End: 2024-03-02 | Stop reason: HOSPADM

## 2024-02-29 RX ORDER — SODIUM CHLORIDE 9 MG/ML
100 INJECTION, SOLUTION INTRAVENOUS CONTINUOUS
Status: ACTIVE | OUTPATIENT
Start: 2024-02-29 | End: 2024-03-01

## 2024-02-29 RX ORDER — NITROGLYCERIN 40 MG/100ML
INJECTION INTRAVENOUS AS NEEDED
Status: DISCONTINUED | OUTPATIENT
Start: 2024-02-29 | End: 2024-03-01 | Stop reason: HOSPADM

## 2024-02-29 RX ADMIN — DOCUSATE SODIUM 100 MG: 100 CAPSULE, LIQUID FILLED ORAL at 20:12

## 2024-02-29 RX ADMIN — HEPARIN SODIUM 1000 UNITS/HR: 10000 INJECTION, SOLUTION INTRAVENOUS at 12:53

## 2024-02-29 RX ADMIN — DAPAGLIFLOZIN 10 MG: 10 TABLET, FILM COATED ORAL at 09:40

## 2024-02-29 RX ADMIN — SODIUM CHLORIDE 100 ML/HR: 9 INJECTION, SOLUTION INTRAVENOUS at 20:13

## 2024-02-29 RX ADMIN — ASPIRIN 81 MG CHEWABLE TABLET 81 MG: 81 TABLET CHEWABLE at 09:40

## 2024-02-29 RX ADMIN — NITROGLYCERIN 30 MCG/MIN: 20 INJECTION INTRAVENOUS at 14:25

## 2024-02-29 RX ADMIN — ATORVASTATIN CALCIUM 80 MG: 80 TABLET, FILM COATED ORAL at 20:12

## 2024-02-29 RX ADMIN — DOCUSATE SODIUM 100 MG: 100 CAPSULE, LIQUID FILLED ORAL at 09:39

## 2024-02-29 RX ADMIN — ALLOPURINOL 300 MG: 300 TABLET ORAL at 09:40

## 2024-02-29 RX ADMIN — FUROSEMIDE 20 MG: 20 TABLET ORAL at 09:40

## 2024-02-29 RX ADMIN — FERROUS GLUCONATE 324 MG: 324 TABLET ORAL at 09:39

## 2024-02-29 RX ADMIN — HYDRALAZINE HYDROCHLORIDE 100 MG: 50 TABLET ORAL at 20:11

## 2024-02-29 RX ADMIN — SPIRONOLACTONE 50 MG: 25 TABLET ORAL at 09:39

## 2024-02-29 RX ADMIN — HYDRALAZINE HYDROCHLORIDE 100 MG: 50 TABLET ORAL at 09:40

## 2024-02-29 RX ADMIN — CARVEDILOL 25 MG: 25 TABLET, FILM COATED ORAL at 09:40

## 2024-02-29 RX ADMIN — TAMSULOSIN HYDROCHLORIDE 0.4 MG: 0.4 CAPSULE ORAL at 09:40

## 2024-02-29 ASSESSMENT — COLUMBIA-SUICIDE SEVERITY RATING SCALE - C-SSRS
2. HAVE YOU ACTUALLY HAD ANY THOUGHTS OF KILLING YOURSELF?: NO
6. HAVE YOU EVER DONE ANYTHING, STARTED TO DO ANYTHING, OR PREPARED TO DO ANYTHING TO END YOUR LIFE?: NO
1. IN THE PAST MONTH, HAVE YOU WISHED YOU WERE DEAD OR WISHED YOU COULD GO TO SLEEP AND NOT WAKE UP?: NO

## 2024-02-29 ASSESSMENT — PAIN - FUNCTIONAL ASSESSMENT
PAIN_FUNCTIONAL_ASSESSMENT: 0-10

## 2024-02-29 ASSESSMENT — ENCOUNTER SYMPTOMS
ALLERGIC/IMMUNOLOGIC NEGATIVE: 1
SHORTNESS OF BREATH: 1
HEMATOLOGIC/LYMPHATIC NEGATIVE: 1
PSYCHIATRIC NEGATIVE: 1
ENDOCRINE NEGATIVE: 1
NEUROLOGICAL NEGATIVE: 1
GASTROINTESTINAL NEGATIVE: 1
CONSTITUTIONAL NEGATIVE: 1
EYES NEGATIVE: 1
ARTHRALGIAS: 1

## 2024-02-29 ASSESSMENT — COGNITIVE AND FUNCTIONAL STATUS - GENERAL
MOBILITY SCORE: 24
DAILY ACTIVITIY SCORE: 24

## 2024-02-29 ASSESSMENT — PAIN SCALES - GENERAL
PAINLEVEL_OUTOF10: 0 - NO PAIN

## 2024-02-29 NOTE — PROGRESS NOTES
"Spiritual Care Visit    Clinical Encounter Type  Visited With: Patient and family together  Routine Visit: Follow-up  Surgical Visit: Pre-op (Waiting for procedure going to Cath Lab)  Crisis Visit: ED (Intodudtory visit was Code Rapid Response)  Referral To:     [facility] Encounters  [facility] Needs: Prayer    Patient Spiritual Care Encounters  Coping: Consistently demonstrated  Dignified Life Closure: Consistently demonstrated    Family Spiritual Care Encounters  Family Coping: Accepting  Family Participation in Care: Consistently demonstrated  Family Support During Treatment: Consistently demonstrated  Caregiver-Patient Relationship: Not compromised    Assessment: Pt stated that he was better. He was waiting for a procedure that will hopefully give direction for going forward.  Concern: \"If there is blockage in my heart where is it?\" The pt stated that he wanted to know the result of the procedure so that he can \"get on with living his life\"  Awareness: The pt discussed his pass history of heart health. He stated that he has had experience being in ICU.  Support: His fiancee was at his bedside and is his primary support. The shea of the couple is essential to their well being.   Outcome: After prayer and discussions of his past and current challenges both the pt and his fiancee stated that they appreciated the visit.  Follow up: There will be another visit scheduled.    Chaplain Sebastien Morales"

## 2024-02-29 NOTE — H&P
History Of Present Illness  Marco Henderson III is a 68 y.o. male presenting with chest pain. PMHx significant for pertinent medical history for coronary artery disease post PCI x 2 ( RCA Intervention X 2 ;status post  Cypher stent in the proximal RCA 12/2005 (3.5 X 13 mm), Taxus stent in the mid RCA 7/2007 (3.0 X 20 mm), history of typical atrial flutter status post ablation 10/2022.      Past Medical History  He has a past medical history of CHF (congestive heart failure) (CMS/[facility]), Coronary artery disease, Hyperlipidemia, and Hypertension.    Surgical History  He has no past surgical history on file.     Social History  He reports that he has been smoking cigarettes. He has never used smokeless tobacco. No history on file for alcohol use and drug use.    Family History  No family history on file.     Allergies  Lisinopril and Valsartan    Home Medications  No current facility-administered medications on file prior to encounter.     Current Outpatient Medications on File Prior to Encounter   Medication Sig Dispense Refill    allopurinol (Zyloprim) 300 mg tablet Take 1 tablet (300 mg) by mouth once daily.      aspirin 81 mg chewable tablet Chew 1 tablet (81 mg) once daily.      atorvastatin (Lipitor) 40 mg tablet Take 1 tablet (40 mg) by mouth once daily at bedtime.      blood sugar diagnostic strip Use as instructed      carvedilol (Coreg) 25 mg tablet Take 1 tablet (25 mg) by mouth 2 times a day with meals.      Farxiga 10 mg Take 1 tablet (10 mg) by mouth once daily in the morning.      ferrous gluconate 324 (38 Fe) mg tablet Take 1 tablet (324 mg) by mouth once daily.      furosemide (Lasix) 20 mg tablet Take 1 tablet (20 mg) by mouth once daily.      hydrALAZINE (Apresoline) 100 mg tablet Take 1 tablet (100 mg) by mouth 3 times a day with meals.      isosorbide mononitrate ER (Imdur) 120 mg 24 hr tablet Take 1 tablet (120 mg) by mouth once daily in the morning.      multivit-mins/iron/folic/lycop (CENTRUM MEN  ORAL) Take 1 tablet by mouth once daily.      nitroglycerin (Nitrostat) 0.4 mg SL tablet Place 1 tablet (0.4 mg) under the tongue if needed.      omega-3 1,000 mg capsule capsule Take 1 tablet by mouth once daily.      spironolactone (Aldactone) 50 mg tablet Take 1 tablet (50 mg) by mouth once daily.      tamsulosin (Flomax) 0.4 mg 24 hr capsule TAKE 1 CAPSULE BY MOUTH EVERY DAY 1/2 HOUR FOLLOWING THE SAME MEAL EACH DAY      [DISCONTINUED] cyclobenzaprine (Flexeril) 10 mg tablet Take 1 tablet (10 mg) by mouth 2 times a day as needed.      [DISCONTINUED] DOCOSAHEXAENOIC ACID ORAL Take 1 capsule by mouth.      [DISCONTINUED] eplerenone (Inspra) 25 mg tablet Take 1 tablet (25 mg) by mouth once daily.      [DISCONTINUED] eplerenone (Inspra) 50 mg tablet Take 1 tablet (50 mg) by mouth once daily.      [DISCONTINUED] fluticasone (Flonase) 50 mcg/actuation nasal spray Administer 2 sprays into each nostril once daily.      [DISCONTINUED] furosemide (Lasix) 40 mg tablet Take 1 tablet (40 mg) by mouth twice a day.      [DISCONTINUED] gabapentin (Neurontin) 300 mg capsule Day 1 Take 300mg at bedtime(one pill), Day 7 600mg at bedtime, (two pills) 180 capsule 2    [DISCONTINUED] hydrALAZINE (Apresoline) 50 mg tablet Take 2 tablets (100 mg) by mouth 3 times a day with meals.      [DISCONTINUED] ibuprofen 800 mg tablet Take 1 tablet (800 mg) by mouth every 8 hours if needed.      [DISCONTINUED] isosorbide dinitrate (Isordil) 30 mg tablet Take 1 tablet (30 mg) by mouth twice a day.      [DISCONTINUED] Klor-Con M10 10 mEq ER tablet Take 1 tablet (10 mEq) by mouth once daily.      [DISCONTINUED] lidocaine (Lidoderm) 5 % patch APPLY TWO PATCHES TO CLEAN DRY SKIN EVERY DAY FOR LEG PAIN.  PATCH SHOULD REMAIN ON SKIN NO LONGER THAN 12 HOURS IN ANY 24 HOUR PERIOD.      [DISCONTINUED] magnesium oxide (Mag-Ox) 400 mg (241.3 mg magnesium) tablet Take 1 tablet (400 mg) by mouth once daily.      [DISCONTINUED] magnesium oxide (Mag-Ox) 420 mg  tablet Take 1 tablet (420 mg) by mouth twice a day.      [DISCONTINUED] melatonin 3 mg tablet Take 1 tablet (3 mg) by mouth as needed at bedtime (for insomnia).      [DISCONTINUED] potassium chloride CR 20 mEq ER tablet Take 1 tablet (20 mEq) by mouth once daily.      [DISCONTINUED] traZODone (Desyrel) 50 mg tablet Take 1 tablet (50 mg) by mouth once daily at bedtime.      [DISCONTINUED] Xarelto 20 mg tablet Take 1 tablet (20 mg) by mouth once daily in the evening. Take with meals.            Inpatient Medications:  Scheduled medications   Medication Dose Route Frequency    allopurinol  300 mg oral Daily    aspirin  324 mg oral Once    aspirin  81 mg oral Daily    atorvastatin  80 mg oral Nightly    carvedilol  25 mg oral BID with meals    dapagliflozin propanediol  10 mg oral q AM    docusate sodium  100 mg oral BID    ferrous gluconate  324 mg oral Daily    furosemide  20 mg oral Daily    hydrALAZINE  100 mg oral TID    [Held by provider] isosorbide mononitrate ER  120 mg oral q AM    perflutren lipid microspheres  0.5-10 mL of dilution intravenous Once in imaging    spironolactone  50 mg oral Daily    tamsulosin  0.4 mg oral Daily     PRN medications   Medication    heparin     Continuous Medications   Medication Dose Last Rate    heparin  0-4,000 Units/hr 1,000 Units/hr (02/29/24 1253)    nitroglycerin  5-200 mcg/min 30 mcg/min (02/29/24 1425)    sodium chloride 0.9%  100 mL/hr           Review of Systems   Constitutional: Negative.    HENT: Negative.     Eyes: Negative.    Respiratory:  Positive for shortness of breath.    Cardiovascular:  Positive for chest pain.   Gastrointestinal: Negative.    Endocrine: Negative.    Genitourinary: Negative.    Musculoskeletal:  Positive for arthralgias (LUE).   Skin: Negative.    Allergic/Immunologic: Negative.    Neurological: Negative.    Hematological: Negative.    Psychiatric/Behavioral: Negative.            Physical Exam    General:  Patient is awake, alert, and  "oriented.  Patient is in no acute distress.  HEENT:  Pupils equal and reactive.  Normocephalic.  Moist mucosa.    Neck:  No thyromegaly.  JVD### Carotids normal, no bruit.   Cardiovascular:  Regular rate and rhythm.  Normal S1 and S2. No murmurs/rubs/gallops. Radial pulses 2+.   Pulmonary:  Clear to auscultation bilaterally.  Abdomen:  Soft. Non-tender.   Non-distended.  Positive bowel sounds.  Lower Extremities:  Pedal pulses 2+ No LE edema.  Neurologic:  Cranial nerves II-XII grossly intact.   No focal deficit.   Skin: Skin warm and dry, no lesions. Normal skin turgor.   Psychiatric: Normal affect.      Sedation Plan    ASA 3     Mallampati class: II.         Last Recorded Vitals  Blood pressure 175/90, pulse 58, temperature 36 °C (96.8 °F), temperature source Tympanic, resp. rate 18, height 1.727 m (5' 8\"), weight 82.2 kg (181 lb 3.5 oz), SpO2 94 %.         Vitals from the Past 24 Hours  Heart Rate:  [58-72]   Temp:  [36 °C (96.8 °F)-37.3 °C (99.2 °F)]   Resp:  [18]   BP: (125-188)/()   Weight:  [82.2 kg (181 lb 3.5 oz)]   SpO2:  [94 %-97 %]          Relevant Results    Labs    CBC:   Recent Labs     02/29/24  0838 02/28/24  0942   WBC 5.3 5.1   HGB 12.8* 12.7*   HCT 39.2* 38.4*    219   MCV 93 92     BMP/CMP:   Recent Labs     02/29/24  0838 02/28/24  0942    138   K 3.8 3.5    105   BUN 18 20   CREATININE 1.10 1.04   CO2 27 25   CALCIUM 8.8 8.9   PROT  --  7.3   BILITOT  --  0.7   ALKPHOS  --  66   ALT  --  15   AST  --  15   GLUCOSE 119* 117*      Lipid Panel:   Recent Labs     02/29/24  0838   CHOL 121   HDL 59.8   CHHDL 2.0   VLDL 17   TRIG 87   NHDL 61     Cardiac   Results from last 7 days   Lab Units 02/28/24  1223 02/28/24  0942   TROPHS ng/L 8 14      Hemoglobin A1C:   Recent Labs     07/15/20  2139   HGBA1C 4.9     TSH/ Free T4: No results for input(s): \"TSH\", \"FREET4\" in the last 78561 hours.  Iron: No results for input(s): \"FERRITIN\", \"TIBC\", \"IRONSAT\", \"BNP\" in the last " "99253 hours.  Coag:   Results from last 7 days   Lab Units 02/28/24  1430   INR  1.0     ABO: No results found for: \"ABO\"    Past Cardiology Tests (Last 3 Years):  EKG:  Encounter Date: 02/28/24   ECG 12 Lead   Result Value    Ventricular Rate 67    Atrial Rate 67    WA Interval 172    QRS Duration 136    QT Interval 420    QTC Calculation(Bazett) 443    P Axis 67    R Axis -43    T Axis 157    QRS Count 11    Q Onset 216    P Onset 130    P Offset 186    T Offset 426    QTC Fredericia 436    Narrative    Sinus rhythm with Premature atrial complexes  Left axis deviation  Left ventricular hypertrophy with QRS widening and repolarization abnormality  Cannot rule out Septal infarct , age undetermined  Abnormal ECG    Confirmed by Eliseo Le ([id number]) on 2/29/2024 12:23:49 PM     Echo:  Results for orders placed during the hospital encounter of 02/28/24    Transthoracic Echo (TTE) Complete    Narrative  [facility], [address] [place] [address]  Tel [phone] and Fax [phone]    TRANSTHORACIC ECHOCARDIOGRAM REPORT      Patient Name:      RENATA Talley Physician:    [id number] Gauthier DO  Study Date:        2/28/2024            Ordering Provider:    [id number] GAUTHIER  MRN/PID:           [id number]             Fellow:  Accession#:        [id number]         Nurse:  Date of Birth/Age: 1955 / 68 years Sonographer:          Tanya Bang RDCS  Gender:            M                    Additional Staff:  Height:            173.00 cm            Admit Date:           2/28/2024  Weight:            85.00 kg             Admission Status:     Inpatient -  Routine  BSA / BMI:         1.99 m2 / 28.40      Encounter#:           [id number]  kg/m2  Department Location:  [facility] Non  Invasive  Blood Pressure: 138 /99 mmHg    Study Type:    TRANSTHORACIC ECHO (TTE) COMPLETE  Diagnosis/ICD: ST elevation (STEMI) myocardial infarction of unspecified  site-I21.3  Indication:    " stemi  CPT Code:      Echo Complete w Full Doppler-[id number]    Patient History:  Pertinent History: HTN. stemi.    Study Detail: The following Echo studies were performed: 2D, M-Mode, Doppler and  color flow. Technically challenging study due to poor acoustic  windows and body habitus. Definity used as a contrast agent for  endocardial border definition. Total contrast used for this  procedure was 2 mL via IV push.      PHYSICIAN INTERPRETATION:  Left Ventricle: The left ventricular systolic function is normal, with an estimated ejection fraction of 50-55%. There are no regional wall motion abnormalities. The left ventricular cavity size is normal. Abnormal (paradoxical) septal motion, consistent with left bundle branch block. Spectral Doppler shows an impaired relaxation pattern of left ventricular diastolic filling.  Left Atrium: The left atrium is moderately dilated.  Right Ventricle: The right ventricle is slightly enlarged. There is normal right ventricular global systolic function.  Right Atrium: The right atrium is mildly dilated.  Aortic Valve: The aortic valve is trileaflet. There is no evidence of aortic valve regurgitation. The peak instantaneous gradient of the aortic valve is 9.1 mmHg. The mean gradient of the aortic valve is 5.0 mmHg.  Mitral Valve: The mitral valve is normal in structure. There is trace mitral valve regurgitation.  Tricuspid Valve: The tricuspid valve is structurally normal. There is trace tricuspid regurgitation. The Doppler estimated RVSP is slightly elevated at 32.4 mmHg.  Pulmonic Valve: The pulmonic valve is structurally normal. There is physiologic pulmonic valve regurgitation.  Pericardium: There is no pericardial effusion noted.  Aorta: The aortic root is normal. There is no dilatation of the ascending aorta. There is no dilatation of the aortic root.  In comparison to the previous echocardiogram(s): There are no prior studies on this patient for comparison  purposes.      CONCLUSIONS:  1. Left ventricular systolic function is normal with a 50-55% estimated ejection fraction.  2. Spectral Doppler shows an impaired relaxation pattern of left ventricular diastolic filling.  3. Abnormal septal motion consistent with left bundle branch block.  4. The left atrium is moderately dilated.  5. Slightly elevated RVSP.    QUANTITATIVE DATA SUMMARY:  2D MEASUREMENTS:  Normal Ranges:  LAs:           3.90 cm    (2.7-4.0cm)  IVSd:          1.25 cm    (0.6-1.1cm)  LVPWd:         1.40 cm    (0.6-1.1cm)  LVIDd:         5.25 cm    (3.9-5.9cm)  LVIDs:         3.85 cm  LV Mass Index: 191.2 g/m2  LV % FS        26.7 %    LA VOLUME:  Normal Ranges:  LA Vol A4C:        78.9 ml    (22+/-6mL/m2)  LA Vol A2C:        91.7 ml  LA Vol BP:         87.9 ml  LA Vol Index A4C:  39.6ml/m2  LA Vol Index A2C:  46.1 ml/m2  LA Vol Index BP:   44.2 ml/m2  LA Area A4C:       22.8 cm2  LA Area A2C:       25.4 cm2  LA Major Axis A4C: 5.6 cm  LA Major Axis A2C: 6.0 cm  LA Volume Index:   44.2 ml/m2    RA VOLUME BY A/L METHOD:  Normal Ranges:  RA Area A4C: 21.1 cm2    M-MODE MEASUREMENTS:  Normal Ranges:  Ao Root: 3.50 cm (2.0-3.7cm)  LAs:     4.70 cm (2.7-4.0cm)    AORTA MEASUREMENTS:  Normal Ranges:  Ao Sinus, d: 3.39 cm (2.1-3.5cm)  Ao STJ, d:   3.15 cm (1.7-3.4cm)  Asc Ao, d:   3.12 cm (2.1-3.4cm)    LV SYSTOLIC FUNCTION BY 2D PLANIMETRY (MOD):  Normal Ranges:  EF-A4C View: 57.7 % (>=55%)  EF-A2C View: 51.3 %  EF-Biplane:  55.2 %    LV DIASTOLIC FUNCTION:  Normal Ranges:  MV Peak E:        0.60 m/s    (0.7-1.2 m/s)  MV Peak A:        0.89 m/s    (0.42-0.7 m/s)  E/A Ratio:        0.67        (1.0-2.2)  MV lateral e'     0.06 m/s  MV medial e'      0.03 m/s  MV A Dur:         158.00 msec  E/e' Ratio:       9.60        (<8.0)  PulmV Sys Jareth:    42.00 cm/s  PulmV Keen Jareth:   23.80 cm/s  PulmV S/D Jareth:    1.80  PulmV A Revs Jareth: 42.90 cm/s  PulmV A Revs Dur: 87.00 msec    MITRAL VALVE:  Normal Ranges:  MV DT: 214  msec (150-240msec)    AORTIC VALVE:  Normal Ranges:  AoV Vmax:                1.51 m/s (<=1.7m/s)  AoV Peak P.1 mmHg (<20mmHg)  AoV Mean P.0 mmHg (1.7-11.5mmHg)  LVOT Max Jareth:            0.86 m/s (<=1.1m/s)  AoV VTI:                 33.20 cm (18-25cm)  LVOT VTI:                18.30 cm  LVOT Diameter:           2.20 cm  (1.8-2.4cm)  AoV Area, VTI:           2.10 cm2 (2.5-5.5cm2)  AoV Area,Vmax:           2.18 cm2 (2.5-4.5cm2)  AoV Dimensionless Index: 0.55      RIGHT VENTRICLE:  RV Basal 4.61 cm  RV Mid   3.92 cm  RV Major 7.8 cm  TAPSE:   25.2 mm    TRICUSPID VALVE/RVSP:  Normal Ranges:  Peak TR Velocity: 2.71 m/s  Est. RA Pressure: 3 mmHg  RV Syst Pressure: 32.4 mmHg (< 30mmHg)  IVC Diam:         1.64 cm    PULMONIC VALVE:  Normal Ranges:  PV Accel Time: 100 msec (>120ms)  PV Max Jareth:    1.0 m/s  (0.6-0.9m/s)  PV Max P.2 mmHg    Pulmonary Veins:  PulmV A Revs Dur: 87.00 msec  PulmV A Revs Jareth: 42.90 cm/s  PulmV Keen Jareth:   23.80 cm/s  PulmV S/D Jareth:    1.80  PulmV Sys Jareth:    42.00 cm/s      [id number] Junaid Khanna DO  Electronically signed on 2024 at 11:39:55 AM        ** Final **    Ejection Fractions:  LV biplane EF   Date/Time Value Ref Range Status   2024 11:13 AM 55 %      Cath:  2009.  Coronary anatomy:                   -    Left main trunk: The left main trunk is a normal vessel.                   -    Left anterior descending:  There is a 60% stenosis in a large        (>3.0mm) mid left anterior descending.                   -    Left circumflex: The left circumflex has mild non-obstructive        stenosis and is a non-dominant vessel.                   2. The left main trunk appears to be angiographically normal. The left        anterior descending artery has a 60-70% lesion in its mid portion just        before the origin of the second diagonal branch. There is mild disease        distally. The left circumflex artery is large, dominant, and gives  rise        to a small first obtuse marginal, and a medium-sized second obtuse        marginal branch. There are luminal irregularities. The right coronary        artery is medium to large-sized, and has widely patent stents in its        proximal and mid portions. There is mild angiographic disease.                 FFR interrogation of the lesion in the mid LAD with a RADI wire yielded        a lowest value of 0.82.    s.     Stress Test:  12/03/2018  CONCLUSIONS:    1. SPECT Perfusion Study: No evidence of ischemia or infarction.    2. A mild fixed reduction in observed activity in the inferior wall   exhibits function which is comparable to other ventricular regions and is   considered to reflect attenuation artifact.    3. Functional capacity N/A (pharmacological).    4. Left ventricle is moderately dilated. The left ventricle systolic   function is moderately decreased.    5. Right ventricle is mildly dilated. The right ventricle systolic   function is mildly decreased.    6. This is an intermediate risk scan, due to the observed reduction in   LVEF.            Gated Stress FBP    LVEF % 37            Assessment/Plan  Assessment/Plan   Principal Problem:    Chest pain, unspecified type  Active Problems:    Atrial fibrillation (CMS/HCC)    Coronary atherosclerosis due to lipid rich plaque (CODE)    LBBB (left bundle branch block)        #CP  #CAD  -C with Dr. Maldonado 2/29/24        I spent 30 minutes in the professional and overall care of this patient.      Jennifer Egan, KYUNG-CNP

## 2024-02-29 NOTE — PROGRESS NOTES
Marco Henderson III is a 68 y.o. male on day 1 of admission presenting with Chest pain, unspecified type.      Subjective   Pt seen this morning. On room air. Feels much better. No cp or sob. LHC today. No overnight events reported.        Objective     Last Recorded Vitals  /90   Pulse 58   Temp 36 °C (96.8 °F) (Tympanic)   Resp 18   Wt 82.2 kg (181 lb 3.5 oz)   SpO2 94%   Intake/Output last 3 Shifts:  No intake or output data in the 24 hours ending 02/29/24 1634    Admission Weight  Weight: 84.8 kg (187 lb) (02/28/24 0902)    Daily Weight  02/29/24 : 82.2 kg (181 lb 3.5 oz)    Image Results  ECG 12 Lead  Sinus rhythm with Premature atrial complexes  Left axis deviation  Left ventricular hypertrophy with QRS widening and repolarization abnormality  Cannot rule out Septal infarct , age undetermined  Abnormal ECG    Confirmed by Eliseo Le ([id number]) on 2/29/2024 12:23:49 PM  Electrocardiogram, 12-lead PRN ACS symptoms  Sinus rhythm with marked sinus arrhythmia  Left bundle branch block  Abnormal ECG  When compared with ECG of 28-FEB-2024 09:06, (unconfirmed)  Left bundle branch block is now Present  See ED provider note for full interpretation and clinical correlation  Confirmed by Ashley Gilbert ([id number]) on 2/29/2024 12:04:36 PM  ECG 12 lead  Normal sinus rhythm  Left ventricular hypertrophy with QRS widening ( Sokolow-Pineda , [place] product )  Cannot rule out Septal infarct , age undetermined  T wave abnormality, consider inferior ischemia  Abnormal ECG  When compared with ECG of 28-FEB-2024 09:28, (unconfirmed)  Left bundle branch block is no longer Present  Minimal criteria for Septal infarct are now Present  See ED provider note for full interpretation and clinical correlation  Confirmed by Ashley Gilbert ([id number]) on 2/29/2024 12:04:21 PM  ECG 12 Lead  Normal sinus rhythm  Left ventricular hypertrophy with QRS widening ( Sokolow-Pineda , [place] product )  T wave abnormality, consider lateral ischemia  Abnormal  ECG  No previous ECGs available  See ED provider note for full interpretation and clinical correlation  Confirmed by Ashley Gilbert ([id number]) on 2/29/2024 12:03:42 PM      Physical Exam  Constitutional:       Appearance: Normal appearance.   Cardiovascular:      Rate and Rhythm: Normal rate and regular rhythm.   Pulmonary:      Effort: Pulmonary effort is normal.      Breath sounds: Normal breath sounds.   Abdominal:      General: Abdomen is flat. Bowel sounds are normal.      Palpations: Abdomen is soft.   Musculoskeletal:      Cervical back: Normal range of motion.   Skin:     General: Skin is warm.   Neurological:      General: No focal deficit present.      Mental Status: He is alert and oriented to person, place, and time. Mental status is at baseline.   Psychiatric:         Mood and Affect: Mood normal.         Behavior: Behavior normal.         Thought Content: Thought content normal.         Judgment: Judgment normal.         Relevant Results               Assessment/Plan          Principal Problem:    Chest pain, unspecified type  Active Problems:    Atrial fibrillation (CMS/HCC)    Coronary atherosclerosis due to lipid rich plaque (CODE)    LBBB (left bundle branch block)    2/29:  ACS, ? Nstemi... cont hep gtt, Niitro gtt, asa, statin, bb, LHC today, f/up additional cardio recs.   Hx Aflutter... bb, restart xarelto after LHC.   Hx HFrEF50%... appears euvolemic... cont lasix, bb, hydralazine, aldactone. Imdur currently on hold given niitro gtt.   Home regimen for chronic conditions  Dvt px covered with hep gtt.   Pt lives at home, no cane.          Ty Smith MD

## 2024-02-29 NOTE — PROGRESS NOTES
"Subjective Data:  Seen in cath lab holding   No chest pain today  Nitroglycerin drip infusing at 30mcg/min  Remains on heparin drip     Overnight Events:    N/A     Objective Data:  Last Recorded Vitals:  Vitals:    02/29/24 0400 02/29/24 0846 02/29/24 1212 02/29/24 1405   BP: 125/76 (!) 188/94 155/85 (!) 186/100   BP Location: Right arm Right arm Right arm    Patient Position: Lying Lying Lying    Pulse: 65 63 59 63   Resp: 18   18   Temp: 36.6 °C (97.8 °F) 36.7 °C (98.1 °F) 36.8 °C (98.3 °F) 36 °C (96.8 °F)   TempSrc: Temporal Temporal Temporal Tympanic   SpO2: 96%  94%    Weight: 82.2 kg (181 lb 3.5 oz)      Height:           Last Labs:  LABS:  CMP:  Results from last 7 days   Lab Units 02/29/24  0838 02/28/24  0942   SODIUM mmol/L 136 138   POTASSIUM mmol/L 3.8 3.5   CHLORIDE mmol/L 102 105   CO2 mmol/L 27 25   ANION GAP mmol/L 11 12   BUN mg/dL 18 20   CREATININE mg/dL 1.10 1.04   EGFR mL/min/1.73m*2 72 77   ALBUMIN g/dL  --  3.9   ALT U/L  --  15   AST U/L  --  15   BILIRUBIN TOTAL mg/dL  --  0.7     CBC:  Results from last 7 days   Lab Units 02/29/24  0838 02/28/24  0942   WBC AUTO x10*3/uL 5.3 5.1   HEMOGLOBIN g/dL 12.8* 12.7*   HEMATOCRIT % 39.2* 38.4*   PLATELETS AUTO x10*3/uL 211 219   MCV fL 93 92     COAG:   Results from last 7 days   Lab Units 02/28/24  1430   INR  1.0     ABO: No results found for: \"ABO\"  HEME/ENDO:     CARDIAC:   Results from last 7 days   Lab Units 02/28/24  1223 02/28/24  0942   TROPHS ng/L 8 14     Recent Labs     02/29/24  0838   CHOL 121   LDLCALC 44   HDL 59.8   TRIG 87      Imagine results  XR chest 1 view   Final Result   No acute cardiopulmonary process radiographically.        MACRO:   None.        Signed by: Erica Salmeron 2/28/2024 11:26 AM   Dictation workstation:   [id number]      Transthoracic Echo (TTE) Complete   Final Result      Cardiac catheterization - coronary    (Results Pending)         Last I/O:  No intake/output data recorded.    Past Cardiology Tests (Last " 3 Years):  EKG:  ECG 12 Lead 02/29/2024    EKG:  -- Sinus rhythm at 77 bpm with LVH/ Left Bundle Morphology with BiFid P-Waves   -- Similar in appearance to EKG Below           [facility] EKG 10/2022      Echo:  Transthoracic Echo (TTE) Complete 02/28/2024   1. Left ventricular systolic function is normal with a 50-55% estimated ejection fraction.   2. Spectral Doppler shows an impaired relaxation pattern of left ventricular diastolic filling.   3. Abnormal septal motion consistent with left bundle branch block.   4. The left atrium is moderately dilated.   5. Slightly elevated RVSP.    Echo 10/2022   Left ventricular systolic function is mildly globally reduced.    The left ventricular ejection fraction (LVEF) is 45% +/- 5%    Right ventricular function is normal.    No hemodynamically significant valve disease.    Noninvasive hemodynamic assessment is consistent with normal pulmonary artery systolic pressure.     Echocardiogram 2/18/2019  - Exam indication: CHF   - The left ventricle is mildly dilated. Left ventricular systolic function is   mildly decreased. EF = 50 ± 5% (visual est.) Grade II left ventricular diastolic   dysfunction.   - The right ventricle is normal in size. Right ventricular systolic function is   normal.   - The left atrial cavity is moderately dilated.   - The right atrial cavity is mildly dilated.   - Estimated right ventricular systolic pressure is not reported due to an   insufficient tricuspid regurgitation signal. Estimated right atrial pressure is   not included as the IVC was not seen.   - Exam was compared with the prior  echocardiographic exam performed on   11/30/2018. LV function has improved.          Ejection Fractions:  EF   Date/Time Value Ref Range Status   02/28/2024 11:13 AM 55 %      Cath:  2009 1.  Coronary anatomy:                   -    Left main trunk: The left main trunk is a normal vessel.                   -    Left anterior descending:  There is a 60%  stenosis in a large        (>3.0mm) mid left anterior descending.                   -    Left circumflex: The left circumflex has mild non-obstructive        stenosis and is a non-dominant vessel.                   2. The left main trunk appears to be angiographically normal. The left        anterior descending artery has a 60-70% lesion in its mid portion just        before the origin of the second diagonal branch. There is mild disease        distally. The left circumflex artery is large, dominant, and gives rise        to a small first obtuse marginal, and a medium-sized second obtuse        marginal branch. There are luminal irregularities. The right coronary        artery is medium to large-sized, and has widely patent stents in its        proximal and mid portions. There is mild angiographic disease.                 FFR interrogation of the lesion in the mid LAD with a RADI wire yielded        a lowest value of 0.82.    s.    Stress Test:  12/03/2018  CONCLUSIONS:    1. SPECT Perfusion Study: No evidence of ischemia or infarction.    2. A mild fixed reduction in observed activity in the inferior wall   exhibits function which is comparable to other ventricular regions and is   considered to reflect attenuation artifact.    3. Functional capacity N/A (pharmacological).    4. Left ventricle is moderately dilated. The left ventricle systolic   function is moderately decreased.    5. Right ventricle is mildly dilated. The right ventricle systolic   function is mildly decreased.    6. This is an intermediate risk scan, due to the observed reduction in   LVEF.            Gated Stress FBP    LVEF % 37     Cardiac Imaging:  No results found for this or any previous visit from the past 1095 days.      Inpatient Medications:  Scheduled medications   Medication Dose Route Frequency    allopurinol  300 mg oral Daily    aspirin  324 mg oral Once    aspirin  81 mg oral Daily    atorvastatin  80 mg oral Nightly    carvedilol  " 25 mg oral BID with meals    dapagliflozin propanediol  10 mg oral q AM    docusate sodium  100 mg oral BID    ferrous gluconate  324 mg oral Daily    furosemide  20 mg oral Daily    hydrALAZINE  100 mg oral TID    [Held by provider] isosorbide mononitrate ER  120 mg oral q AM    perflutren lipid microspheres  0.5-10 mL of dilution intravenous Once in imaging    spironolactone  50 mg oral Daily    tamsulosin  0.4 mg oral Daily     PRN medications   Medication    heparin     Continuous Medications   Medication Dose Last Rate    heparin  0-4,000 Units/hr 1,000 Units/hr (02/29/24 1253)    nitroglycerin  5-200 mcg/min 30 mcg/min (02/29/24 1425)    sodium chloride 0.9%  100 mL/hr         Physical Exam:  GENERAL: alert, cooperative, pleasant, in no acute distress  SKIN: warm, dry  NECK: no JVD  CARDIAC: Regular rate and rhythm no murmurs  CHEST: Normal respiratory efforts, lungs clear to auscultation bilaterally.  ABDOMEN: soft, nondistended  EXTREMITIES: no lower extremity edema  NEURO: Alert and oriented x 3.  Grossly normal.  Moves all 4 extremities.        Assessment/Plan     Assessment/Plan   Marco Henderson III is a 68 y.o. M presenting with a pertinent medical history for coronary artery disease post PCI x 2 ( RCA Intervention X 2 ;status post  Cypher stent in the proximal RCA 12/2005 (3.5 X 13 mm), Taxus stent in the mid RCA 7/2007 (3.0 X 20 mm), history of typical atrial flutter status post ablation 10/2022 with [id number]  Score at least 4, not on anticoagulation as of roughly November 2023 secondary to Pain Injection, never restarted, history of chronic heart failure with reduced ejection fraction 45% per last echocardiogram 10/2022 at [facility], Not on GDMT  diabetes mellitus type 2 on oral medications, essential hypertension who presented to the emergency department with chest discomfort.  Cardiology has been consulted for \"Chest Pain\"  I personally reviewed the patient's recent labs, " medications, orders, EKGs, and pertinent cardiac imaging/ echocardiography.    Echocardiogram showed normal LV systolic function with no wall motion abnormalities. Troponin remain negative.         ACS Care   --Continue aspirin 81 mg daily  -- Continue Heparin Gtt and nitroglycerin drip   --Continue atorvastatin 80 mg daily   -- NPO for Cath Lab today   --LDL 44 hemoglobin   -- Hold on initiation of Ticagrelor at this time given Hx of Known Multi-Vessel Disease   --Will need to resume Oral Anticoagulation after completion of Cath Lab for management of Atrial Fibrillation / Flutter Hx  -- Historically has been on Rivaroxaban 20 mg Daily      Will need resumption GDMT for Systolic Heart Failure care.   -- This will occur after echocardiogram and Completion of Cath  -- Historical GDMT:  ++ BB: Historically Carvedilol 25 mg BID (resumed)  ++ ACE/ARB/ARNI: NONE -- Documented Intolerance to ACE and ARB with Swelling with both classes  ++ MRA: Historically Eplerenone 50 mg Daily   ++ SGLT2-inhibitor: Farxiga 10 (resumed)  ++ Diuretic Furosemide 40 mg BID   ++ Historically -Hydralazine 100 mg TID (resumed) / Isosorbide Mononitrate 120 mg (on hold currently on nitroglycerin dri)          Code Status:  Full Code    George Christian, KYUNG-CNP    ===============================================  Attending Note   ===============================================  Both the MELITON and I have had a face to face encounter with the patient today. I have examined the patient and edited the documented physical examination as necessary.  I personally reviewed the patient's vital signs, telemetry, recent labs, medications, orders, EKGs, and pertinent cardiac imaging/ echocardiography.  I have reviewed the MELITON's encounter note, approve the MELITON's documentation and have edited the note to reflect my diagnostic and therapeutic plan.    Patient seen and examined in Cath Lab Holding. He is resting comfortably and has not had recurrent pain  "    Marco Henderson III is a 68 y.o. M presenting with a pertinent medical history for coronary artery disease post PCI x 2 ( RCA Intervention X 2 ;status post  Cypher stent in the proximal RCA 12/2005 (3.5 X 13 mm), Taxus stent in the mid RCA 7/2007 (3.0 X 20 mm), history of typical atrial flutter status post ablation 10/2022 with [id number]  Score at least 4, not on anticoagulation as of roughly November 2023 secondary to Pain Injection, never restarted, history of chronic heart failure with reduced ejection fraction 45% per last echocardiogram 10/2022 at [facility], Not on GDMT  diabetes mellitus type 2 on oral medications, essential hypertension who presented to the emergency department with chest discomfort.  Cardiology has been consulted for \"Chest Pain\"  I personally reviewed the patient's recent labs, medications, orders, EKGs, and pertinent cardiac imaging/ echocardiography.    Echocardiogram showed normal LV systolic function with no wall motion abnormalities. Troponin remain negative.         ACS Care   --Continue aspirin 81 mg daily  -- Continue Heparin Gtt and nitroglycerin drip   --Continue atorvastatin 80 mg daily   -- NPO for Cath Lab today   --LDL 44 hemoglobin   -- Hold on initiation of Ticagrelor at this time given Hx of Known Multi-Vessel Disease   --Will need to resume Oral Anticoagulation after completion of Cath Lab for management of Atrial Fibrillation / Flutter Hx  -- Historically has been on Rivaroxaban 20 mg Daily      Will need resumption GDMT for Systolic Heart Failure care.   -- This will occur after echocardiogram and Completion of Cath  -- Historical GDMT:  ++ BB: Historically Carvedilol 25 mg BID (resumed)  ++ ACE/ARB/ARNI: NONE -- Documented Intolerance to ACE and ARB with Swelling with both classes  ++ MRA: Historically Eplerenone 50 mg Daily   ++ SGLT2-inhibitor: Farxiga 10 (resumed)  ++ Diuretic Furosemide 40 mg BID   ++ Historically -Hydralazine 100 mg TID " (resumed) / Isosorbide Mononitrate 120 mg (on hold currently on nitroglycerin dri)        Resume medications following Cath   Better blood pressure is essential     Junaid Khanna DO   Clinical Cardology   [facility]  [facility]

## 2024-02-29 NOTE — CARE PLAN
The patient's goals for the shift include      The clinical goals for the shift include for patient to remain free of chest pain and hemodynamically stable

## 2024-02-29 NOTE — PROGRESS NOTES
02/29/24 0003   Discharge Planning   Patient expects to be discharged to: Home        Patient came in with chest pain. He is on Heparin drip and ntg drip. Patient is waiting for cardiac catheterization for today. When medically cleared patient will go home with his wife.

## 2024-02-29 NOTE — CARE PLAN
The patient's goals for the shift include      The clinical goals for the shift include pt will remain hemodynamically stable throughout shift.    Over the shift, the patient did not make progress toward the following goals. Barriers to progression include . Recommendations to address these barriers include .

## 2024-03-01 ENCOUNTER — APPOINTMENT (OUTPATIENT)
Dept: CARDIOLOGY | Facility: HOSPITAL | Age: 69
DRG: 321 | End: 2024-03-01
Payer: MEDICARE

## 2024-03-01 LAB
ERYTHROCYTE [DISTWIDTH] IN BLOOD BY AUTOMATED COUNT: 13.2 % (ref 11.5–14.5)
GLUCOSE BLD MANUAL STRIP-MCNC: 122 MG/DL (ref 74–99)
GLUCOSE BLD MANUAL STRIP-MCNC: 212 MG/DL (ref 74–99)
HCT VFR BLD AUTO: 40.6 % (ref 41–52)
HGB BLD-MCNC: 13.6 G/DL (ref 13.5–17.5)
HOLD SPECIMEN: NORMAL
MCH RBC QN AUTO: 30.3 PG (ref 26–34)
MCHC RBC AUTO-ENTMCNC: 33.5 G/DL (ref 32–36)
MCV RBC AUTO: 90 FL (ref 80–100)
NRBC BLD-RTO: 0 /100 WBCS (ref 0–0)
PLATELET # BLD AUTO: 232 X10*3/UL (ref 150–450)
RBC # BLD AUTO: 4.49 X10*6/UL (ref 4.5–5.9)
UFH PPP CHRO-ACNC: 0.3 IU/ML
WBC # BLD AUTO: 8.9 X10*3/UL (ref 4.4–11.3)

## 2024-03-01 PROCEDURE — 2500000001 HC RX 250 WO HCPCS SELF ADMINISTERED DRUGS (ALT 637 FOR MEDICARE OP): Performed by: NURSE PRACTITIONER

## 2024-03-01 PROCEDURE — 99233 SBSQ HOSP IP/OBS HIGH 50: CPT | Performed by: INTERNAL MEDICINE

## 2024-03-01 PROCEDURE — 93005 ELECTROCARDIOGRAM TRACING: CPT

## 2024-03-01 PROCEDURE — 2500000002 HC RX 250 W HCPCS SELF ADMINISTERED DRUGS (ALT 637 FOR MEDICARE OP, ALT 636 FOR OP/ED): Performed by: NURSE PRACTITIONER

## 2024-03-01 PROCEDURE — 2500000005 HC RX 250 GENERAL PHARMACY W/O HCPCS: Performed by: NURSE PRACTITIONER

## 2024-03-01 PROCEDURE — 2500000004 HC RX 250 GENERAL PHARMACY W/ HCPCS (ALT 636 FOR OP/ED): Performed by: NURSE PRACTITIONER

## 2024-03-01 PROCEDURE — 2500000004 HC RX 250 GENERAL PHARMACY W/ HCPCS (ALT 636 FOR OP/ED): Performed by: HOSPITALIST

## 2024-03-01 PROCEDURE — 85520 HEPARIN ASSAY: CPT | Performed by: NURSE PRACTITIONER

## 2024-03-01 PROCEDURE — 82947 ASSAY GLUCOSE BLOOD QUANT: CPT

## 2024-03-01 PROCEDURE — 36415 COLL VENOUS BLD VENIPUNCTURE: CPT | Performed by: NURSE PRACTITIONER

## 2024-03-01 PROCEDURE — 85027 COMPLETE CBC AUTOMATED: CPT | Performed by: NURSE PRACTITIONER

## 2024-03-01 PROCEDURE — 2060000001 HC INTERMEDIATE ICU ROOM DAILY

## 2024-03-01 RX ORDER — CLOPIDOGREL BISULFATE 300 MG/1
600 TABLET, FILM COATED ORAL ONCE
Status: COMPLETED | OUTPATIENT
Start: 2024-03-02 | End: 2024-03-02

## 2024-03-01 RX ORDER — HYDRALAZINE HYDROCHLORIDE 20 MG/ML
10 INJECTION INTRAMUSCULAR; INTRAVENOUS EVERY 6 HOURS PRN
Status: DISCONTINUED | OUTPATIENT
Start: 2024-03-01 | End: 2024-03-02 | Stop reason: HOSPADM

## 2024-03-01 RX ORDER — CLOPIDOGREL BISULFATE 75 MG/1
75 TABLET ORAL DAILY
Status: DISCONTINUED | OUTPATIENT
Start: 2024-03-03 | End: 2024-03-02 | Stop reason: HOSPADM

## 2024-03-01 RX ORDER — ISOSORBIDE MONONITRATE 30 MG/1
120 TABLET, EXTENDED RELEASE ORAL DAILY
Status: DISCONTINUED | OUTPATIENT
Start: 2024-03-01 | End: 2024-03-02 | Stop reason: HOSPADM

## 2024-03-01 RX ADMIN — TAMSULOSIN HYDROCHLORIDE 0.4 MG: 0.4 CAPSULE ORAL at 08:34

## 2024-03-01 RX ADMIN — DOCUSATE SODIUM 100 MG: 100 CAPSULE, LIQUID FILLED ORAL at 08:35

## 2024-03-01 RX ADMIN — HYDRALAZINE HYDROCHLORIDE 100 MG: 50 TABLET ORAL at 14:06

## 2024-03-01 RX ADMIN — CARVEDILOL 25 MG: 25 TABLET, FILM COATED ORAL at 16:00

## 2024-03-01 RX ADMIN — NITROGLYCERIN 30 MCG/MIN: 20 INJECTION INTRAVENOUS at 14:06

## 2024-03-01 RX ADMIN — ISOSORBIDE MONONITRATE 120 MG: 30 TABLET, EXTENDED RELEASE ORAL at 15:58

## 2024-03-01 RX ADMIN — HYDRALAZINE HYDROCHLORIDE 10 MG: 20 INJECTION INTRAMUSCULAR; INTRAVENOUS at 06:38

## 2024-03-01 RX ADMIN — SPIRONOLACTONE 50 MG: 25 TABLET ORAL at 08:34

## 2024-03-01 RX ADMIN — DAPAGLIFLOZIN 10 MG: 10 TABLET, FILM COATED ORAL at 08:35

## 2024-03-01 RX ADMIN — Medication: at 08:00

## 2024-03-01 RX ADMIN — RIVAROXABAN 20 MG: 20 TABLET, FILM COATED ORAL at 16:00

## 2024-03-01 RX ADMIN — FUROSEMIDE 20 MG: 20 TABLET ORAL at 08:34

## 2024-03-01 RX ADMIN — HYDRALAZINE HYDROCHLORIDE 10 MG: 20 INJECTION INTRAMUSCULAR; INTRAVENOUS at 00:36

## 2024-03-01 RX ADMIN — ALLOPURINOL 300 MG: 300 TABLET ORAL at 08:34

## 2024-03-01 RX ADMIN — HEPARIN SODIUM 1000 UNITS/HR: 10000 INJECTION, SOLUTION INTRAVENOUS at 01:18

## 2024-03-01 RX ADMIN — ASPIRIN 81 MG CHEWABLE TABLET 81 MG: 81 TABLET CHEWABLE at 08:34

## 2024-03-01 RX ADMIN — HYDRALAZINE HYDROCHLORIDE 100 MG: 50 TABLET ORAL at 08:34

## 2024-03-01 RX ADMIN — ATORVASTATIN CALCIUM 80 MG: 80 TABLET, FILM COATED ORAL at 19:52

## 2024-03-01 RX ADMIN — FERROUS GLUCONATE 324 MG: 324 TABLET ORAL at 08:34

## 2024-03-01 RX ADMIN — HYDRALAZINE HYDROCHLORIDE 10 MG: 20 INJECTION INTRAMUSCULAR; INTRAVENOUS at 09:31

## 2024-03-01 RX ADMIN — TICAGRELOR 90 MG: 90 TABLET ORAL at 08:34

## 2024-03-01 RX ADMIN — HYDRALAZINE HYDROCHLORIDE 100 MG: 50 TABLET ORAL at 19:53

## 2024-03-01 RX ADMIN — CARVEDILOL 25 MG: 25 TABLET, FILM COATED ORAL at 08:34

## 2024-03-01 RX ADMIN — DOCUSATE SODIUM 100 MG: 100 CAPSULE, LIQUID FILLED ORAL at 19:52

## 2024-03-01 ASSESSMENT — PAIN - FUNCTIONAL ASSESSMENT
PAIN_FUNCTIONAL_ASSESSMENT: 0-10

## 2024-03-01 ASSESSMENT — PAIN SCALES - GENERAL
PAINLEVEL_OUTOF10: 0 - NO PAIN

## 2024-03-01 NOTE — PROGRESS NOTES
Marco Henderson III is a 68 y.o. male on day 2 of admission presenting with Chest pain, unspecified type.      Subjective   On room air, very mild chest pain, afebrile, on niitro gtt and hep gtt. Significant other bedside. No overnight events reported. Pt was transferred to ICU s/p PCI due to niitro gtt and persistent chest pain.        Objective     Last Recorded Vitals  /87   Pulse 93   Temp 36.6 °C (97.9 °F) (Temporal)   Resp 19   Wt 82.2 kg (181 lb 3.5 oz)   SpO2 98%   Intake/Output last 3 Shifts:    Intake/Output Summary (Last 24 hours) at 3/1/2024 1025  Last data filed at 3/1/2024 0600  Gross per 24 hour   Intake 790.9 ml   Output 555 ml   Net 235.9 ml       Admission Weight  Weight: 84.8 kg (187 lb) (02/28/24 0902)    Daily Weight  02/29/24 : 82.2 kg (181 lb 3.5 oz)    Image Results  ECG 12 Lead  Normal sinus rhythm  Left axis deviation  Left ventricular hypertrophy with QRS widening and repolarization abnormality  Abnormal ECG  When compared with ECG of 28-FEB-2024 16:58,  Premature atrial complexes are no longer Present  Minimal criteria for Septal infarct are no longer Present  T wave inversion less evident in Anterior leads  QT has lengthened      Physical Exam  Constitutional:       Appearance: Normal appearance.   Cardiovascular:      Rate and Rhythm: Normal rate and regular rhythm.   Pulmonary:      Effort: Pulmonary effort is normal.      Breath sounds: Normal breath sounds.   Abdominal:      General: Abdomen is flat. Bowel sounds are normal.      Palpations: Abdomen is soft.   Musculoskeletal:      Cervical back: Normal range of motion.   Skin:     General: Skin is warm.   Neurological:      General: No focal deficit present.      Mental Status: He is alert and oriented to person, place, and time. Mental status is at baseline.   Psychiatric:         Mood and Affect: Mood normal.         Behavior: Behavior normal.         Thought Content: Thought content normal.         Judgment: Judgment  normal.         Relevant Results               Assessment/Plan   This patient currently has cardiac telemetry ordered; if you would like to modify or discontinue the telemetry order, click here to go to the orders activity to modify/discontinue the order.      Principal Problem:    Chest pain, unspecified type  Active Problems:    Atrial fibrillation (CMS/HCC)    Coronary atherosclerosis due to lipid rich plaque (CODE)    LBBB (left bundle branch block)    3/1:  CAD s/p PCI... cont asa, brilinta, statin, bb, f/up additional cardio recs.   Still on niitro gtt... convert to imdur per cardio.   Hx aflutter... on hep gtt... convert to home xarelto per cardio.         2/29:  ACS, ? Nstemi... cont hep gtt, Niitro gtt, asa, statin, bb, LHC today, f/up additional cardio recs.   Hx Aflutter... bb, restart xarelto after LHC.   Hx HFrEF50%... appears euvolemic... cont lasix, bb, hydralazine, aldactone. Imdur currently on hold given niitro gtt.   Home regimen for chronic conditions  Dvt px covered with hep gtt.   Pt lives at home, no cane.          Ty Smith MD

## 2024-03-01 NOTE — POST-PROCEDURE NOTE
Physician Transition of Care Summary  Invasive Cardiovascular Lab    Procedure Date: 2/29/2024  Attending:    Mal Sotelo - Primary  Resident/Fellow/Other Assistant: Surgeon(s) and Role:    Indications:   Pre-op Diagnosis     * Acute combined systolic and diastolic heart failure (CMS/HCC) [I50.41]     * Coronary atherosclerosis due to lipid rich plaque (CODE) [I25.83]    Post-procedure diagnosis:   Post-op Diagnosis     * Acute combined systolic and diastolic heart failure (CMS/HCC) [I50.41]     * Coronary atherosclerosis due to lipid rich plaque (CODE) [I25.83]    Procedure(s):   Left Heart Cath with Coronary Angiography and LV  09805 - DC CATH PLMT L HRT & ARTS W/NJX & ANGIO IMG S&I    Procedure Findings:   -Hemodynamically significant [FFR + at 0.76] heavily calcified 60-70% mid LAD stenosis.  Otherwise mild to moderate RCA disease, mild RCA ISR, mild CAD elsewhere in the left-dominant system.  -Status post successful OCT-guided PCI to mid LAD using 3.5 x 30 mm HERMAN [postdilated using 3.5 NC balloon], this was complicated by loss of diagonal 2 with failure to rescue despite multiple attempts [likely diagonal 2 had dissection on the top of plaque shift post LAD PCI].  There was also a small dissection of the diseased apical LAD but NURIA-3 flow.  TTE in the lab with no effusion.    Access/Closure:  6 [place] right radial artery, closed with TR band.      Complications:   As above.    Stents/Implants:   Cardiovascular Implants       Stent    Stent, [place] Herman, 3.50 X 30rx - [id number] - Implanted        Inventory item: STENT, QUOC FRONTIER HERMAN, 3.50 X 30RX Model/Cat number: [id number]    : [employer] Lot number: [id number]    Device identifier: [id number]        As of 2/29/2024       Status: Implanted                              Anticoagulation/Antiplatelet Plan:   Aspirin and Brilinta    Estimated Blood Loss:   5 mL    Anesthesia: Moderate Sedation Anesthesia Staff: No anesthesia staff  entered.    Any Specimen(s) Removed:   No specimens collected during this procedure.    Disposition:   -Will observe in the ICU overnight due to residual 1-2 out of 10 chest pain and uncontrolled hypertension requiring nitroglycerin drip.  -Aspirin 81 mg once daily for life and ticagrelor 90 mg twice daily for at least 12 months.  -TR band removal per protocol.  IV hydration per protocol.  -Further management as per inpatient cardiology team.    Electronically signed by: Lexy Albright MD, 2/29/2024 7:07 PM

## 2024-03-01 NOTE — PROGRESS NOTES
"Subjective Data:  Transferred to ICU post procedure for closer observation  Denies any chest pain or shortness of breath     Overnight Events:    N/A     Objective Data:  Last Recorded Vitals:  Vitals:    03/01/24 1202 03/01/24 1300 03/01/24 1400 03/01/24 1500   BP: 160/85 (!) 154/93 138/80 129/81   BP Location:       Patient Position:       Pulse: 77 82 78 79   Resp: 19 16 22 19   Temp:       TempSrc:       SpO2:       Weight:       Height:           Last Labs:  LABS:  CMP:  Results from last 7 days   Lab Units 02/29/24  0838 02/28/24  0942   SODIUM mmol/L 136 138   POTASSIUM mmol/L 3.8 3.5   CHLORIDE mmol/L 102 105   CO2 mmol/L 27 25   ANION GAP mmol/L 11 12   BUN mg/dL 18 20   CREATININE mg/dL 1.10 1.04   EGFR mL/min/1.73m*2 72 77   ALBUMIN g/dL  --  3.9   ALT U/L  --  15   AST U/L  --  15   BILIRUBIN TOTAL mg/dL  --  0.7       CBC:  Results from last 7 days   Lab Units 03/01/24  0504 02/29/24  0838 02/28/24  0942   WBC AUTO x10*3/uL 8.9 5.3 5.1   HEMOGLOBIN g/dL 13.6 12.8* 12.7*   HEMATOCRIT % 40.6* 39.2* 38.4*   PLATELETS AUTO x10*3/uL 232 211 219   MCV fL 90 93 92       COAG:   Results from last 7 days   Lab Units 02/28/24  1430   INR  1.0       ABO: No results found for: \"ABO\"  HEME/ENDO:  Results from last 7 days   Lab Units 02/29/24  0837   HEMOGLOBIN A1C % 4.8      CARDIAC:   Results from last 7 days   Lab Units 02/28/24  1223 02/28/24  0942   TROPHS ng/L 8 14       Recent Labs     02/29/24  0838   CHOL 121   LDLCALC 44   HDL 59.8   TRIG 87        Imagine results  Transthoracic Echo (TTE) Limited   Final Result      Cardiac catheterization - coronary   Final Result      XR chest 1 view   Final Result   No acute cardiopulmonary process radiographically.        MACRO:   None.        Signed by: Erica Salmeron 2/28/2024 11:26 AM   Dictation workstation:   [id number]      Transthoracic Echo (TTE) Complete   Final Result            Last I/O:  I/O last 3 completed shifts:  In: 790.9 (9.6 mL/kg) [I.V.:790.9 (9.6 " mL/kg)]  Out: 555 (6.8 mL/kg) [Urine:550 (0.2 mL/kg/hr); Blood:5]  Weight: 82.2 kg     Past Cardiology Tests (Last 3 Years):  EKG:  ECG 12 Lead 02/29/2024    EKG:  -- Sinus rhythm at 77 bpm with LVH/ Left Bundle Morphology with BiFid P-Waves   -- Similar in appearance to EKG Below           [facility] EKG 10/2022      Echo:  Transthoracic Echo (TTE) Complete 02/28/2024   1. Left ventricular systolic function is normal with a 50-55% estimated ejection fraction.   2. Spectral Doppler shows an impaired relaxation pattern of left ventricular diastolic filling.   3. Abnormal septal motion consistent with left bundle branch block.   4. The left atrium is moderately dilated.   5. Slightly elevated RVSP.    Echo 10/2022   Left ventricular systolic function is mildly globally reduced.    The left ventricular ejection fraction (LVEF) is 45% +/- 5%    Right ventricular function is normal.    No hemodynamically significant valve disease.    Noninvasive hemodynamic assessment is consistent with normal pulmonary artery systolic pressure.     Echocardiogram 2/18/2019  - Exam indication: CHF   - The left ventricle is mildly dilated. Left ventricular systolic function is   mildly decreased. EF = 50 ± 5% (visual est.) Grade II left ventricular diastolic   dysfunction.   - The right ventricle is normal in size. Right ventricular systolic function is   normal.   - The left atrial cavity is moderately dilated.   - The right atrial cavity is mildly dilated.   - Estimated right ventricular systolic pressure is not reported due to an   insufficient tricuspid regurgitation signal. Estimated right atrial pressure is   not included as the IVC was not seen.   - Exam was compared with the prior  echocardiographic exam performed on   11/30/2018. LV function has improved.          Ejection Fractions:  EF   Date/Time Value Ref Range Status   02/29/2024 07:51 PM 43 %    02/28/2024 11:13 AM 55 %      Cath:  [facility] 2/29/2024  Coronary Lesion  Summary:  Vessel        Stenosis               Vessel Segment  LAD             60-70               proximal to mid  LAD         50% stenosis       distal third of the distal  RCA    40% in-stent restenosis      proximal to mid  RPDA        40% stenosis             mid to distal       CONCLUSIONS:   1. Hemodynamically significant [FFR + at 0.76] heavily calcified 60-70% mid LAD stenosis. Otherwise mild to moderate RCA disease, mild RCA ISR, mild CAD elsewhere in the left_dominant system.   2. Status post successful OCT_guided PCI to mid LAD using 3.5 x 30 mm AZEEM [postdilated using 3.5 NC balloon], this was complicated by loss of diagonal 2 with failure to rescue despite multiple attempts [likely diagonal 2 had dissection on the top of plaque shift post LAD PCI]. There was also a small dissection of the diseased apical LAD but NURIA-3 flow. TTE in the lab with no effusion.   3. Will observe in the ICU overnight due to residual 1-2 out of 10 chest pain and uncontrolled hypertension requiring nitroglycerin drip.   4. Aspirin 81 mg once daily for life and ticagrelor 90 mg twice daily for at least 12 months.    2009 1.  Coronary anatomy:                   -    Left main trunk: The left main trunk is a normal vessel.                   -    Left anterior descending:  There is a 60% stenosis in a large        (>3.0mm) mid left anterior descending.                   -    Left circumflex: The left circumflex has mild non-obstructive        stenosis and is a non-dominant vessel.                   2. The left main trunk appears to be angiographically normal. The left        anterior descending artery has a 60-70% lesion in its mid portion just        before the origin of the second diagonal branch. There is mild disease        distally. The left circumflex artery is large, dominant, and gives rise        to a small first obtuse marginal, and a medium-sized second obtuse        marginal branch. There are luminal  irregularities. The right coronary        artery is medium to large-sized, and has widely patent stents in its        proximal and mid portions. There is mild angiographic disease.                 FFR interrogation of the lesion in the mid LAD with a RADI wire yielded        a lowest value of 0.82.    s.    Stress Test:  12/03/2018  CONCLUSIONS:    1. SPECT Perfusion Study: No evidence of ischemia or infarction.    2. A mild fixed reduction in observed activity in the inferior wall   exhibits function which is comparable to other ventricular regions and is   considered to reflect attenuation artifact.    3. Functional capacity N/A (pharmacological).    4. Left ventricle is moderately dilated. The left ventricle systolic   function is moderately decreased.    5. Right ventricle is mildly dilated. The right ventricle systolic   function is mildly decreased.    6. This is an intermediate risk scan, due to the observed reduction in   LVEF.            Gated Stress FBP    LVEF % 37     Cardiac Imaging:  No results found for this or any previous visit from the past 1095 days.      Inpatient Medications:  Scheduled medications   Medication Dose Route Frequency    allopurinol  300 mg oral Daily    aspirin  324 mg oral Once    aspirin  81 mg oral Daily    atorvastatin  80 mg oral Nightly    carvedilol  25 mg oral BID with meals    dapagliflozin propanediol  10 mg oral q AM    docusate sodium  100 mg oral BID    ferrous gluconate  324 mg oral Daily    furosemide  20 mg oral Daily    hydrALAZINE  100 mg oral TID    [Held by provider] isosorbide mononitrate ER  120 mg oral q AM    oxygen   inhalation Continuous - 02/gases    perflutren lipid microspheres  0.5-10 mL of dilution intravenous Once in imaging    perflutren lipid microspheres  0.5-10 mL of dilution intravenous Once in imaging    perflutren protein A microsphere  0.5 mL intravenous Once in imaging    spironolactone  50 mg oral Daily    sulfur hexafluoride microsphr  2  "mL intravenous Once in imaging    tamsulosin  0.4 mg oral Daily    ticagrelor  90 mg oral BID     PRN medications   Medication    acetaminophen    Or    acetaminophen    Or    acetaminophen    [Held by provider] heparin    hydrALAZINE     Continuous Medications   Medication Dose Last Rate    heparin  0-4,000 Units/hr 1,000 Units/hr (03/01/24 0600)    nitroglycerin  5-200 mcg/min 30 mcg/min (03/01/24 1406)       Physical Exam:  GENERAL: alert, cooperative, pleasant, in no acute distress  SKIN: warm, dry  NECK: no JVD  CARDIAC: Regular rate and rhythm no murmurs  CHEST: Normal respiratory efforts, lungs clear to auscultation bilaterally.  ABDOMEN: soft, nondistended  EXTREMITIES: no lower extremity edema  NEURO: Alert and oriented x 3.  Grossly normal.  Moves all 4 extremities.        Assessment/Plan     Assessment/Plan   Marco Henderson III is a 68 y.o. M presenting with a pertinent medical history for coronary artery disease post PCI x 2 ( RCA Intervention X 2 ;status post  Cypher stent in the proximal RCA 12/2005 (3.5 X 13 mm), Taxus stent in the mid RCA 7/2007 (3.0 X 20 mm), history of typical atrial flutter status post ablation 10/2022 with [id number]  Score at least 4, not on anticoagulation as of roughly November 2023 secondary to Pain Injection, never restarted, history of chronic heart failure with reduced ejection fraction 45% per last echocardiogram 10/2022 at [facility], Not on GDMT  diabetes mellitus type 2 on oral medications, essential hypertension who presented to the emergency department with chest discomfort.  Cardiology has been consulted for \"Chest Pain\"  I personally reviewed the patient's recent labs, medications, orders, EKGs, and pertinent cardiac imaging/ echocardiography.    Echocardiogram showed normal LV systolic function with no wall motion abnormalities. Troponin remain negative.     2/29/2024  S/p left heart cath with PCI to LAD. Procedure complicated by loss of diagonal " 2 with failure to rescue despite multiple attempts.   [likely diagonal 2 had dissection on the top of plaque shift post LAD PCI].  There was also a small dissection of the diseased apical LAD but NURIA-3 flow. Transferred to ICU for closer monitoring.     3/1  Denies any chest pain today. Nitroglycerin weaned down to 30mcg/min        ACS Care   S/p PCI to LAD. Procedure complicated by loss of diagonal 2 with failure to rescue  -Plan to stop heparin in favor of Xarelto.  Load with Plavix 600 mg starting tomorrow followed by 75 mg daily.  -Resume Xarelto 20 mg daily  -Stop heparin and nitroglycerin drip  -LDL 44   -     Chronic Systolic Heart Failure care.  LVEF 40-to 45% on TTE this admission  ++BB: Continue carvedilol 25 mg BID   ++ ACE/ARB/ARNI: NONE -- Documented Intolerance to ACE and ARB with Swelling with both classes  ++ MRA: Historically Eplerenone 50 mg Daily   ++ SGLT2-inhibitor: Farxiga 10   ++ Diuretic Furosemide 20 mg BID   ++ Historically -Hydralazine 100 mg TID (resumed) / Isosorbide Mononitrate 120 mg (resumed)     Discussed plan with Dr. Sotelo / Dr Khanna        Code Status:  Full Code    George Christian, APRN-CNP      ==================================================  Attending Note   ==================================================  Both the MELITON and I have had a face to face encounter with the patient today. I have examined the patient and edited the documented physical examination as necessary.  I personally reviewed the patient's recent labs, medications, orders, EKGs, and pertinent cardiac imaging.  I have reviewed the MELITON's encounter note, approve the MELITON's documentation and have edited the note to reflect the diagnostic and therapeutic plan.        No exacerbating or relieving factors.  Patient denies chest pain and angina.  Pt denies shortness of breath, dyspnea on exertion, orthopnea, and paroxysmal nocturnal dyspnea.  Pt denies worsening lower extremity edema.  Pt denies palpitations  or syncope.  No recent falls.  No fever or chills.  No cough.  No change in bowel or bladder habits.  No sick contacts.  No recent travel.     12 point review of systems including (Constitutional, Eyes, ENMT, Respiratory, Cardiac, Gastrointestinal, Neurological, Psychiatric, and Hematologic) was performed and is otherwise negative.    Past medical history:  As above.    Medications were reviewed.    Allergies were reviewed.    Social history:  Patient denies smoking, alcohol abuse, or illicit drug use.    Family history:  No sudden cardiac death or premature coronary artery disease.     General:  Patient is awake, alert, and oriented.  Patient is in no acute distress.  HEENT:  Pupils equal and reactive.  Normocephalic.  Moist mucosa.    Neck:  No thyromegaly.  Normal Jugular Venous Pressure.  Cardiovascular:  Regular rate and rhythm.  Normal S1 and S2.  Pulmonary:  Clear to auscultation bilaterally.  Abdomen:  Soft. Non-tender.   Non-distended.  Positive bowel sounds.  Lower Extremities:  2+ pedal pulses. No LE edema.  Neurologic:  Cranial nerves intact.  No focal deficit.   Skin: Skin warm and dry, normal skin turgor.   Psychiatric: Normal affect.    Vital signs, telemetry, medications, labs, and imaging were reviewed as well.Both the MELITON and I have had a face to face encounter with the patient today. I have examined the patient and edited the documented physical examination as necessary.  I personally reviewed the patient's vital signs, telemetry, recent labs, medications, orders, EKGs, and pertinent cardiac imaging/ echocardiography.  I have reviewed the MELITON's encounter note, approve the MELITON's documentation and have edited the note to reflect my diagnostic and therapeutic plan.      Marco Henderson III is a 68 y.o. M presenting with a pertinent medical history for coronary artery disease post PCI x 2 ( RCA Intervention X 2 ;status post  Cypher stent in the proximal RCA 12/2005 (3.5 X 13 mm), Taxus stent in the mid  "RCA 7/2007 (3.0 X 20 mm), history of typical atrial flutter status post ablation 10/2022 with [id number]  Score at least 4, not on anticoagulation as of roughly November 2023 secondary to Pain Injection, never restarted, history of chronic heart failure with reduced ejection fraction 45% per last echocardiogram 10/2022 at [facility], Not on GDMT  diabetes mellitus type 2 on oral medications, essential hypertension who presented to the emergency department with chest discomfort.  Cardiology has been consulted for \"Chest Pain\"  I personally reviewed the patient's recent labs, medications, orders, EKGs, and pertinent cardiac imaging/ echocardiography.    Echocardiogram showed normal LV systolic function with no wall motion abnormalities. Troponin remain negative.     2/29/2024  S/p left heart cath with PCI to LAD. Procedure complicated by loss of diagonal 2 with failure to rescue despite multiple attempts.   [likely diagonal 2 had dissection on the top of plaque shift post LAD PCI].  There was also a small dissection of the diseased apical LAD but NURIA-3 flow. Transferred to ICU for closer monitoring.     3/1  Denies any chest pain today. Nitroglycerin weaned down to 30mcg/min        ACS Care   S/p PCI to LAD. Procedure complicated by loss of diagonal 2 with failure to rescue  -Plan to stop heparin in favor of Xarelto.  Load with Plavix 600 mg starting tomorrow followed by 75 mg daily.  -Resume Xarelto 20 mg daily  -Stop heparin and nitroglycerin drip  -LDL 44   -     Chronic Systolic Heart Failure care.  LVEF 40-to 45% on TTE this admission  ++BB: Continue carvedilol 25 mg BID   ++ ACE/ARB/ARNI: NONE -- Documented Intolerance to ACE and ARB with Swelling with both classes  ++ MRA: Historically Eplerenone 50 mg Daily   ++ SGLT2-inhibitor: Farxiga 10   ++ Diuretic Furosemide 20 mg BID   ++ Historically -Hydralazine 100 mg TID (resumed) / Isosorbide Mononitrate 120 mg (resumed)     Coordinated " discussion between Dr Sotelo and Myself with Primary Service.

## 2024-03-01 NOTE — PROGRESS NOTES
03/01/24 1650   Discharge Planning   Patient expects to be discharged to: Home with wife           Patient had cardiac catheterization done yesterday and had one stent placed to LAD. He is still on ntg drip and Heparin drip. Cardio is following. When patient is medically cleared will go home.

## 2024-03-02 VITALS
TEMPERATURE: 98.1 F | OXYGEN SATURATION: 96 % | DIASTOLIC BLOOD PRESSURE: 71 MMHG | RESPIRATION RATE: 14 BRPM | HEIGHT: 68 IN | SYSTOLIC BLOOD PRESSURE: 134 MMHG | WEIGHT: 181.22 LBS | BODY MASS INDEX: 27.47 KG/M2 | HEART RATE: 84 BPM

## 2024-03-02 LAB
ANION GAP SERPL CALC-SCNC: 11 MMOL/L (ref 10–20)
BUN SERPL-MCNC: 16 MG/DL (ref 6–23)
CALCIUM SERPL-MCNC: 8.6 MG/DL (ref 8.6–10.3)
CHLORIDE SERPL-SCNC: 101 MMOL/L (ref 98–107)
CO2 SERPL-SCNC: 25 MMOL/L (ref 21–32)
CREAT SERPL-MCNC: 1.16 MG/DL (ref 0.5–1.3)
EGFRCR SERPLBLD CKD-EPI 2021: 68 ML/MIN/1.73M*2
ERYTHROCYTE [DISTWIDTH] IN BLOOD BY AUTOMATED COUNT: 13.1 % (ref 11.5–14.5)
GLUCOSE SERPL-MCNC: 108 MG/DL (ref 74–99)
HCT VFR BLD AUTO: 39.7 % (ref 41–52)
HGB BLD-MCNC: 13.1 G/DL (ref 13.5–17.5)
MCH RBC QN AUTO: 30.5 PG (ref 26–34)
MCHC RBC AUTO-ENTMCNC: 33 G/DL (ref 32–36)
MCV RBC AUTO: 92 FL (ref 80–100)
NRBC BLD-RTO: 0 /100 WBCS (ref 0–0)
PLATELET # BLD AUTO: 215 X10*3/UL (ref 150–450)
POTASSIUM SERPL-SCNC: 3.4 MMOL/L (ref 3.5–5.3)
RBC # BLD AUTO: 4.3 X10*6/UL (ref 4.5–5.9)
SODIUM SERPL-SCNC: 134 MMOL/L (ref 136–145)
WBC # BLD AUTO: 6.3 X10*3/UL (ref 4.4–11.3)

## 2024-03-02 PROCEDURE — 2500000002 HC RX 250 W HCPCS SELF ADMINISTERED DRUGS (ALT 637 FOR MEDICARE OP, ALT 636 FOR OP/ED): Performed by: INTERNAL MEDICINE

## 2024-03-02 PROCEDURE — 80048 BASIC METABOLIC PNL TOTAL CA: CPT | Performed by: INTERNAL MEDICINE

## 2024-03-02 PROCEDURE — 2500000001 HC RX 250 WO HCPCS SELF ADMINISTERED DRUGS (ALT 637 FOR MEDICARE OP): Performed by: NURSE PRACTITIONER

## 2024-03-02 PROCEDURE — 36415 COLL VENOUS BLD VENIPUNCTURE: CPT | Performed by: INTERNAL MEDICINE

## 2024-03-02 PROCEDURE — 2500000002 HC RX 250 W HCPCS SELF ADMINISTERED DRUGS (ALT 637 FOR MEDICARE OP, ALT 636 FOR OP/ED): Performed by: NURSE PRACTITIONER

## 2024-03-02 PROCEDURE — 85027 COMPLETE CBC AUTOMATED: CPT | Performed by: INTERNAL MEDICINE

## 2024-03-02 PROCEDURE — 99239 HOSP IP/OBS DSCHRG MGMT >30: CPT | Performed by: INTERNAL MEDICINE

## 2024-03-02 RX ORDER — ATORVASTATIN CALCIUM 80 MG/1
80 TABLET, FILM COATED ORAL NIGHTLY
Qty: 30 TABLET | Refills: 1 | Status: SHIPPED | OUTPATIENT
Start: 2024-03-02 | End: 2024-03-11 | Stop reason: SDUPTHER

## 2024-03-02 RX ORDER — POTASSIUM CHLORIDE 20 MEQ/1
40 TABLET, EXTENDED RELEASE ORAL ONCE
Status: COMPLETED | OUTPATIENT
Start: 2024-03-02 | End: 2024-03-02

## 2024-03-02 RX ORDER — NAPROXEN SODIUM 220 MG/1
81 TABLET, FILM COATED ORAL DAILY
Qty: 7 TABLET | Refills: 0 | Status: SHIPPED | OUTPATIENT
Start: 2024-03-02 | End: 2024-03-11

## 2024-03-02 RX ORDER — NAPROXEN SODIUM 220 MG/1
81 TABLET, FILM COATED ORAL DAILY
Status: DISCONTINUED | OUTPATIENT
Start: 2024-03-02 | End: 2024-03-02 | Stop reason: HOSPADM

## 2024-03-02 RX ORDER — CLOPIDOGREL BISULFATE 75 MG/1
75 TABLET ORAL DAILY
Qty: 30 TABLET | Refills: 1 | Status: SHIPPED | OUTPATIENT
Start: 2024-03-03 | End: 2024-03-11 | Stop reason: SDUPTHER

## 2024-03-02 RX ADMIN — SPIRONOLACTONE 50 MG: 25 TABLET ORAL at 08:23

## 2024-03-02 RX ADMIN — FERROUS GLUCONATE 324 MG: 324 TABLET ORAL at 08:30

## 2024-03-02 RX ADMIN — TAMSULOSIN HYDROCHLORIDE 0.4 MG: 0.4 CAPSULE ORAL at 08:23

## 2024-03-02 RX ADMIN — DOCUSATE SODIUM 100 MG: 100 CAPSULE, LIQUID FILLED ORAL at 08:21

## 2024-03-02 RX ADMIN — HYDRALAZINE HYDROCHLORIDE 100 MG: 50 TABLET ORAL at 15:00

## 2024-03-02 RX ADMIN — DAPAGLIFLOZIN 10 MG: 10 TABLET, FILM COATED ORAL at 08:23

## 2024-03-02 RX ADMIN — ALLOPURINOL 300 MG: 300 TABLET ORAL at 08:23

## 2024-03-02 RX ADMIN — POTASSIUM CHLORIDE 40 MEQ: 1500 TABLET, EXTENDED RELEASE ORAL at 11:19

## 2024-03-02 RX ADMIN — CLOPIDOGREL BISULFATE 600 MG: 300 TABLET, FILM COATED ORAL at 08:22

## 2024-03-02 RX ADMIN — RIVAROXABAN 20 MG: 20 TABLET, FILM COATED ORAL at 16:01

## 2024-03-02 RX ADMIN — ISOSORBIDE MONONITRATE 120 MG: 30 TABLET, EXTENDED RELEASE ORAL at 08:21

## 2024-03-02 RX ADMIN — CARVEDILOL 25 MG: 25 TABLET, FILM COATED ORAL at 16:00

## 2024-03-02 RX ADMIN — ASPIRIN 81 MG CHEWABLE TABLET 81 MG: 81 TABLET CHEWABLE at 11:53

## 2024-03-02 RX ADMIN — HYDRALAZINE HYDROCHLORIDE 100 MG: 50 TABLET ORAL at 08:21

## 2024-03-02 RX ADMIN — CARVEDILOL 25 MG: 25 TABLET, FILM COATED ORAL at 08:23

## 2024-03-02 RX ADMIN — FUROSEMIDE 20 MG: 20 TABLET ORAL at 08:23

## 2024-03-02 ASSESSMENT — PAIN SCALES - GENERAL
PAINLEVEL_OUTOF10: 0 - NO PAIN

## 2024-03-02 ASSESSMENT — PAIN - FUNCTIONAL ASSESSMENT
PAIN_FUNCTIONAL_ASSESSMENT: 0-10

## 2024-03-02 NOTE — DISCHARGE SUMMARY
Discharge Diagnosis  Chest pain, unspecified type      Discharge Meds     Your medication list        START taking these medications        Instructions Last Dose Given Next Dose Due   clopidogrel 75 mg tablet  Commonly known as: Plavix  Start taking on: March 3, 2024      Take 1 tablet (75 mg) by mouth once daily. Do not start before March 3, 2024.       rivaroxaban 20 mg tablet  Commonly known as: Xarelto      Take 1 tablet (20 mg) by mouth once daily in the evening. Take with meals. Take with food.              CHANGE how you take these medications        Instructions Last Dose Given Next Dose Due   atorvastatin 80 mg tablet  Commonly known as: Lipitor  What changed:   medication strength  how much to take      Take 1 tablet (80 mg) by mouth once daily at bedtime.              CONTINUE taking these medications        Instructions Last Dose Given Next Dose Due   allopurinol 300 mg tablet  Commonly known as: Zyloprim           aspirin 81 mg chewable tablet      Chew 1 tablet (81 mg) once daily for 7 days.       blood sugar diagnostic strip           carvedilol 25 mg tablet  Commonly known as: Coreg           CENTRUM MEN ORAL           Farxiga 10 mg  Generic drug: dapagliflozin propanediol           ferrous gluconate 324 (38 Fe) mg tablet  Commonly known as: Fergon           furosemide 20 mg tablet  Commonly known as: Lasix           hydrALAZINE 100 mg tablet  Commonly known as: Apresoline           isosorbide mononitrate  mg 24 hr tablet  Commonly known as: Imdur           nitroglycerin 0.4 mg SL tablet  Commonly known as: Nitrostat           omega-3 1,000 mg capsule capsule           spironolactone 50 mg tablet  Commonly known as: Aldactone           tamsulosin 0.4 mg 24 hr capsule  Commonly known as: Flomax                     Where to Get Your Medications        These medications were sent to [facility]/pharmacy #[id number] - [place], [place] - [address] [address] [address] [facility]  [address] [address] [facility] [address] [address]      Phone:  [phone]   aspirin 81 mg chewable tablet  atorvastatin 80 mg tablet  clopidogrel 75 mg tablet  rivaroxaban 20 mg tablet         Test Results Pending At Discharge  Pending Labs       No current pending labs.            Hospital Course   Principal Problem:    Chest pain, unspecified type  Active Problems:    Atrial fibrillation (CMS/HCC)    Coronary atherosclerosis due to lipid rich plaque (CODE)    LBBB (left bundle branch block)    3/2:  Off niitro gtt. Pt without chest pain and sob.   Discussed with cardiology who has cleared for dc with dapt for cad and xarelto for afib. Instructed to cont asa for only 7 days and then continue only plavix/xarelto.   K replaced prior to dc.   Pt will f/up with Dr Khanna.     Pt clinically and hemodynamically stable, tolerating PO intake and room air.   Instructed to F/U with PCP within 5 days.   He understands and agrees with the dc plan.     More than 30 min spent on dc.              3/1:  CAD s/p PCI... cont asa, brilinta, statin, bb, f/up additional cardio recs.   Still on niitro gtt... convert to imdur per cardio.   Hx aflutter... on hep gtt... convert to home xarelto per cardio.            2/29:  ACS, ? Nstemi... cont hep gtt, Niitro gtt, asa, statin, bb, LHC today, f/up additional cardio recs.   Hx Aflutter... bb, restart xarelto after LHC.   Hx HFrEF50%... appears euvolemic... cont lasix, bb, hydralazine, aldactone. Imdur currently on hold given niitro gtt.   Home regimen for chronic conditions  Dvt px covered with hep gtt.   Pt lives at home, no cane.     Pertinent Physical Exam At Time of Discharge  Physical Exam  Constitutional:       Appearance: Normal appearance.   Cardiovascular:      Rate and Rhythm: Normal rate and regular rhythm.   Pulmonary:      Effort: Pulmonary effort is normal.      Breath sounds: Normal breath sounds.   Abdominal:      General: Abdomen is flat. Bowel sounds are normal.      Palpations: Abdomen is soft.   Musculoskeletal:      Cervical  "back: Normal range of motion.   Skin:     General: Skin is warm.   Neurological:      General: No focal deficit present.      Mental Status: He is alert and oriented to person, place, and time. Mental status is at baseline.   Psychiatric:         Mood and Affect: Mood normal.         Behavior: Behavior normal.         Thought Content: Thought content normal.         Judgment: Judgment normal.     /65 (BP Location: Left arm, Patient Position: Lying)   Pulse 75   Temp 36.7 °C (98.1 °F) (Temporal)   Resp 25   Ht 1.727 m (5' 8\")   Wt 82.2 kg (181 lb 3.5 oz)   SpO2 96%   BMI 27.55 kg/m²       Outpatient Follow-Up  No future appointments.      Ty Smith MD  "

## 2024-03-02 NOTE — PROGRESS NOTES
"Subjective Data:  No CP, sob  No other complaints     Overnight Events:    None      Objective Data:  Last Recorded Vitals:  Vitals:    24 0818 24 0819 24 0820 24 0821   BP:       BP Location:       Patient Position:       Pulse: 72 70 70 70   Resp: 25 (!) 7  15   Temp:       TempSrc:       SpO2:       Weight:       Height:         Medical Gas Therapy: None (Room air)  Weight  Av.5 kg (184 lb 1.7 oz)  Min: 82.2 kg (181 lb 3.5 oz)  Max: 84.8 kg (187 lb)    LABS:  CMP:  Results from last 7 days   Lab Units 24  0518 24  0838 24  0942   SODIUM mmol/L 134* 136 138   POTASSIUM mmol/L 3.4* 3.8 3.5   CHLORIDE mmol/L 101 102 105   CO2 mmol/L 25 27 25   ANION GAP mmol/L 11 11 12   BUN mg/dL 16 18 20   CREATININE mg/dL 1.16 1.10 1.04   EGFR mL/min/1.73m*2 68 72 77   ALBUMIN g/dL  --   --  3.9   ALT U/L  --   --  15   AST U/L  --   --  15   BILIRUBIN TOTAL mg/dL  --   --  0.7     CBC:  Results from last 7 days   Lab Units 24  0518 24  0504 24  0838 24  0942   WBC AUTO x10*3/uL 6.3 8.9 5.3 5.1   HEMOGLOBIN g/dL 13.1* 13.6 12.8* 12.7*   HEMATOCRIT % 39.7* 40.6* 39.2* 38.4*   PLATELETS AUTO x10*3/uL 215 232 211 219   MCV fL 92 90 93 92     COAG:   Results from last 7 days   Lab Units 24  1430   INR  1.0     ABO: No results found for: \"ABO\"  HEME/ENDO:  Results from last 7 days   Lab Units 24  0837   HEMOGLOBIN A1C % 4.8      CARDIAC:   Results from last 7 days   Lab Units 24  1223 24  0942   TROPHS ng/L 8 14      Results from last 7 days   Lab Units 24  0838 24  0837   HEMOGLOBIN A1C %  --  4.8   LDL CALC mg/dL 44  --    VLDL mg/dL 17  --    CHOLESTEROL/HDL RATIO  2.0  --         Last I/O:    Intake/Output Summary (Last 24 hours) at 3/2/2024 1137  Last data filed at 3/2/2024 0200  Gross per 24 hour   Intake --   Output 1150 ml   Net -1150 ml     Net IO Since Admission: -914.1 mL [24 1137]      Imaging " Results:  Cardiac catheterization - coronary    Result Date: 2/29/2024  =  [facility], Cath Lab, [facility] [address] Cardiovascular Catheterization Report Patient Name:     RENATA NUÑEZ       Performing Physician:  [id number] Sotelo MD Study Date:       2/29/2024           Verifying Physician:   [id number] Sotelo MD MRN/PID:          [id number]            Cardiologist/Co-scrub: Accession#:       [id number]        Ordering Provider:     [id number] LILLIAM GAUTHIER Date of           1955 / 68      Fellow:                [id number] Lexy Albright Birth/Age:        years                                      MD Gender:           M                   Fellow: Encounter#:       [id number]  Study:            Left Heart Cath no LV Additional Study: PCI - Percutaneous Coronary Intervention Additional Study: FFR - Fractional Flow Reserve Additional Study: OCT - Optical Coherence Tomography  Indications: RENATA NUÑEZ is a 68 year old male who presents with coronary artery disease, prior myocardial infarction, prior percutaneous coronary intervention, diabetes, dyslipidemia, hypertension, HFrEF and a chest pain assessment of typical angina. Worsening angina. Medical History: Stress test performed: No. CTA performed: No. Joe accessed: No. LVEF Assessed: No. LVEF = 50-55%. Cardiac arrest: No. Cardiac surgical consult: No. Cardiovascular instability: No. Frailty status of patient entering lab: 4 = Vulnerable.  Procedure Description: After infiltration with 2% Lidocaine, the right radial artery was cannulated with a modified Seldinger technique. Subsequently a 6 Russian sheath was placed retrograde in the right radial artery. Selective coronary catheterization was performed using a 5 Fr catheter(s) exchanged over a guide  wire to cannulate the coronary arteries. A JL 4 tip catheter was used for left coronary injections. A JR 4 tip catheter was used for right coronary injections. Additional catheter(s) used to visualize the coronary arteries were: EBU 3.5 and JL 4. Multiple injections of contrast were made into the left and right coronary arteries with angiograms recorded in multiple projections. After completion of the procedure, the arterial sheath was pulled and a TR Band Radial Compression Device was utilized to obtain patent hemostasis.  Coronary Angiography: The coronary circulation is co-dominant.  Left Main Coronary Artery: The left main coronary artery is a normal caliber vessel. The left main arises normally from the left coronary sinus of Valsalva and bifurcates into the LAD and circumflex coronary arteries. The left main coronary artery showed no significant disease or stenosis greater than 30%.  Left Anterior Descending Coronary Artery Distribution: The left anterior descending coronary artery is a normal caliber vessel. The LAD arises normally from the left main coronary artery. The LAD demonstrated severe atherosclerotic disease. The proximal to mid left anterior descending coronary artery showed 60-70. An additional lesion, located at the distal third of the distal left anterior descending coronary artery, revealed 50% stenosis. The 1st diagonal branch showed no significant disease or stenosis greater than 30%. The 2nd diagonal branch demonstrated no significant disease or stenosis greater than 30%.  Circumflex Coronary Artery Distribution: The circumflex coronary artery is a normal caliber vessel. The circumflex arises normally from the left main coronary artery and terminates in the AV groove. The circumflex revealed mild proximal irregularities. The 1st obtuse marginal branch is a medium-sized caliber vessel. The 1st obtuse marginal branch showed no significant disease or stenosis greater than 30%. The 2nd obtuse  marginal branch is a large caliber vessel. The 2nd obtuse marginal branch demonstrated mild proximal irregularities. The 3rd obtuse marginal branch is a medium-sized caliber vessel. The 3rd obtuse marginal branch revealed no significant disease or stenosis greater than 30%.  Right Coronary Artery Distribution: The right coronary artery is a normal caliber vessel. The RCA arises normally from the right sinus of Valsalva. The RCA showed mild to moderate in-stent restenosis. The proximal to mid right coronary artery showed 40% in-stent restenosis. The right posterior descending artery is a large caliber vessel. The right posterior descending artery showed moderate atherosclerotic disease. The mid to distal right posterior descending artery revealed 40% stenosis.  Coronary Interventions: Angiography reveals a 70% stenosis of the proximal to mid left anterior descending coronary artery. Pre-intervention NURIA flow was 3. Optical Coherence Tomography (OCT) was performed on the lesion within the proximal to mid left anterior descending. The plaque seen was predominantly fibro-calcific, no thrombus was visualized and no dissection was revealed. This lesion was interpreted to be significant. Percutaneous coronary intervention was performed within the proximal to mid left anterior descending. The vessel was pre-dilated using a non-compliant balloon 3.0 mm x 12 mm at 18 HUY. Rich [place] 3.5 mm x 20 mm was advanced to the lesion and implanted at 12 HUY. The stent was post dilated using a non-compliant balloon 3.5 mm x 12 mm at 22 HUY. The stenosis was successfully reduced from 70% to <10%. Post-intervention NURIA flow was 3.  Coronary Intervention Comments: Patient was loaded on aspirin and Brilinta. Additional heparin was given for an ACT greater than 300 during the entire case. A 6 Egyptian EBU 3.75 guiding catheter was used to engage the LMCA after which an FFR wire was passed into the LAD. The FFR wire was equalized with the  transducer just outside the LMCA and subsequently parked into the distal LAD. FFR value was 0.76, RFR was 0.90 and Pd/Pa was 0.90. Intracoronary nitroglycerin was given. A BeneChillonfly OCT cath was advance to image the lesion and demonstrated fibrocalcific disease. A run-through wire was used to wire the diagonal 2 for protection. Predilation was done of the mid LAD lesion with a 3.0 X 12 mm NC balloon at 18 atms. A 3.5 X 30 mm Rich [place] stent was delivered and carefully placed. This was deployed at nominal pressures of 12 atms. After deployment of the stent, we noted loss of diagonal 2 which took off from within the diseased area of LAD. We removed the jailed run through wire from the diagonal 2 and did serial postdilatations of LAD stent with a 3.5 X 12 mm NC balloons at 12 atms to open the stent struts. We subsequently tried to rewire the diagonal 2 with an intent to balloon the vessel and re-establish flow. We used run through followed by Whisper wire but were unable to successfully cross into the true lumen of the diagonal 2, suspect that there was dissection from the LAD lesion post dilation extending into the D2 on the top of plaque shift. Also there was mild dissection of diseased distal LAD, we dottered the balloon and got little better, NURIA III flow. Finally we post dilated the mid portion of LAD stent (at the takeoff of diagonal 2) with a 3.5 X 12 NC ballon at 22 atms. Final angiography demonstrated a reasonable result. The guiding catheter was removed over a J-wire. TR band was used to achieve hemostasis of the right radial artery. The patient tolerated the procedure without difficulty.  Coronary Lesion Summary: Vessel        Stenosis               Vessel Segment LAD             60-70               proximal to mid LAD         50% stenosis       distal third of the distal RCA    40% in-stent restenosis      proximal to mid RPDA        40% stenosis             mid to distal  Hemo Personnel:  +----------------+---------+ Name            Duty      +----------------+---------+ Calvin Sotelo MD 1 +----------------+---------+  Hemodynamic Pressures:  +----+--------------------+----------+-------------+--------------+---------+ Site     Date Time      Phase NameSystolic mmHgDiastolic mmHgMean mmHg +----+--------------------+----------+-------------+--------------+---------+   AO2/29/2024 4:26:20 PM  AIR REST          111            75       93 +----+--------------------+----------+-------------+--------------+---------+   AO2/29/2024 5:04:41 PM  AIR REST          171            85      113 +----+--------------------+----------+-------------+--------------+---------+   AO2/29/2024 5:16:53 PM  AIR REST          185            88      123 +----+--------------------+----------+-------------+--------------+---------+   AO2/29/2024 5:25:57 PM  AIR REST          135            95      115 +----+--------------------+----------+-------------+--------------+---------+   AO2/29/2024 5:30:43 PM  AIR REST          127            88      108 +----+--------------------+----------+-------------+--------------+---------+   AO2/29/2024 5:36:18 PM  AIR REST          202           117      154 +----+--------------------+----------+-------------+--------------+---------+  Complications: Loss of diagonal 2 with failure to rescue despite multiple attempts [likely diagonal 2 had dissection on the top of plaque shift post LAD PCI]. There was also a small dissection of the diseased apical LAD but NURIA-3 flow.  Cardiac Cath Post Procedure Notes: Post Procedure Diagnosis: See below. Blood Loss:               Estimated blood loss during the procedure was 10 mls. Specimens Removed:        Number of specimen(s) removed: none. ____________________________________________________________________________________ CONCLUSIONS:  1. Hemodynamically significant [FFR + at 0.76] heavily  calcified 60-70% mid LAD stenosis. Otherwise mild to moderate RCA disease, mild RCA ISR, mild CAD elsewhere in the left_dominant system.  2. Status post successful OCT_guided PCI to mid LAD using 3.5 x 30 mm AZEEM [postdilated using 3.5 NC balloon], this was complicated by loss of diagonal 2 with failure to rescue despite multiple attempts [likely diagonal 2 had dissection on the top of plaque shift post LAD PCI]. There was also a small dissection of the diseased apical LAD but NURIA-3 flow. TTE in the lab with no effusion.  3. Will observe in the ICU overnight due to residual 1-2 out of 10 chest pain and uncontrolled hypertension requiring nitroglycerin drip.  4. Aspirin 81 mg once daily for life and ticagrelor 90 mg twice daily for at least 12 months. 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.2  CPT Codes: Coronary Angiography S&I only ([facility])([facility])-85331; FFR, initial Vessel (PCI)-11496; Stent w angioplasty Left Anterior Descending single major Artery branch (PCI)-60378.LD; OCT Initial Vessel (coronary native vessel or graft) during diagnostic evaluation and/or therapeutic intervention, initial vessel-39072; Moderate Sedation Services initial 15 minutes patient >5 years-23551; Moderate Sedation Services 1st additional 15 minutes patient >5 years-60656; Moderate Sedation Services 2nd additional 15 minutes patient >5 years-14194; Moderate Sedation Services 3rd additional 15 minutes patient >5 years-50829; Complicated/Unusual Procedure-MOD22  [id number] Calvin Sotelo MD Performing Physician Electronically signed by [id number] Calvin Sotelo MD on 2/29/2024 at 8:29:25 PM  ** Final **     Transthoracic Echo (TTE) Limited    Result Date: 2/29/2024   [facility], [facility] [address]              Tel [phone] and Fax [phone] TRANSTHORACIC ECHOCARDIOGRAM REPORT  Patient Name:      RENATA LIMAISCOE        Reading Physician:    [id number] Wasserman                                                                DO Study Date:        2/29/2024            Ordering Provider:    [id number] OMID MARTI MRN/PID:           [id number]             Fellow: Accession#:        [id number]         Nurse: Date of Birth/Age: 1955 / 68 years Sonographer:          Lefty Kaur RDCS Gender:            M                    Additional Staff: Height:            172.72 cm            Admit Date: Weight:            84.82 kg             Admission Status:     Inpatient - STAT BSA / BMI:         1.99 m2 / 28.43      Encounter#:           [id number]                    kg/m2                                         Department Location:  [facility] Cath                                                               Lab Blood Pressure: 175 /90 mmHg Study Type:    TRANSTHORACIC ECHO (TTE) LIMITED Diagnosis/ICD: Other pericardial effusion (noninflammatory)-I31.39 Indication:    Effusion CPT Code:      Echo Limited-14988; Doppler Limited-81185; Color Doppler-47530 Patient History: Pertinent History: CAD and HTN. Study Detail: The following Echo studies were performed: 2D and Doppler.               Technically challenging study due to patient lying in supine               position and body habitus.  PHYSICIAN INTERPRETATION: Left Ventricle: The left ventricular systolic function is mildly to moderately decreased, with an estimated ejection fraction of 40-45%. There are no regional wall motion abnormalities. The left ventricular cavity size was not assessed. Left ventricular diastolic filling was not assessed. LV Wall Scoring: The apical septal segment, apical lateral segment, and apex are akinetic. The mid and apical inferior wall, mid anterolateral segment, and apical anterior segment are hypokinetic. Left Atrium: The left atrium was not assessed. Right Ventricle: The right ventricle was not assessed. Right ventricular systolic function not assessed. Right Atrium: The right atrium was  not assessed. Aortic Valve: The aortic valve was not assessed. Aortic valve regurgitation was not assessed. Mitral Valve: The mitral valve was not assessed. Mitral valve regurgitation was not assessed. Tricuspid Valve: The tricuspid valve was not assessed. Tricuspid regurgitation was not assessed. Pulmonic Valve: The pulmonic valve was not assessed. Pulmonic valve regurgitation was not assessed. Pericardium: There is a trivial to small pericardial effusion. Aorta: The aortic root was not assessed.  CONCLUSIONS:  1. Left ventricular systolic function is mildly to moderately decreased with a 40-45% estimated ejection fraction.  2. Multiple segmental abnormalities exist. See findings.  3. Trivial to small pericardial effusion along anterior and lateral LV wall. QUANTITATIVE DATA SUMMARY: LV SYSTOLIC FUNCTION BY 2D PLANIMETRY (MOD):                     Normal Ranges: EF-A4C View: 41.6 % (>=55%) EF-A2C View: 47.5 % EF-Biplane:  43.3 %  [id number] Durga Wasserman DO Electronically signed on 2/29/2024 at 8:07:12 PM  Wall Scoring  ** Final **     ECG 12 Lead    Result Date: 2/29/2024  Sinus rhythm with Premature atrial complexes Left axis deviation Left ventricular hypertrophy with QRS widening and repolarization abnormality Cannot rule out Septal infarct , age undetermined Abnormal ECG Confirmed by Eliseo Le ([id number]) on 2/29/2024 12:23:49 PM    Electrocardiogram, 12-lead PRN ACS symptoms    Result Date: 2/29/2024  Sinus rhythm with marked sinus arrhythmia Left bundle branch block Abnormal ECG When compared with ECG of 28-FEB-2024 09:06, (unconfirmed) Left bundle branch block is now Present See ED provider note for full interpretation and clinical correlation Confirmed by Ashley Gilbert ([id number]) on 2/29/2024 12:04:36 PM    ECG 12 lead    Result Date: 2/29/2024  Normal sinus rhythm Left ventricular hypertrophy with QRS widening ( Sokolow-Pineda , [place] product ) Cannot rule out Septal infarct , age undetermined T wave abnormality,  consider inferior ischemia Abnormal ECG When compared with ECG of 28-FEB-2024 09:28, (unconfirmed) Left bundle branch block is no longer Present Minimal criteria for Septal infarct are now Present See ED provider note for full interpretation and clinical correlation Confirmed by Ashley Gilbert ([id number]) on 2/29/2024 12:04:21 PM    ECG 12 Lead    Result Date: 2/29/2024  Normal sinus rhythm Left ventricular hypertrophy with QRS widening ( Sokolow-Pineda , [place] product ) T wave abnormality, consider lateral ischemia Abnormal ECG No previous ECGs available See ED provider note for full interpretation and clinical correlation Confirmed by Ashley Gilbert ([id number]) on 2/29/2024 12:03:42 PM    Transthoracic Echo (TTE) Complete    Result Date: 2/28/2024   [facility] [place], [address] [place] [address]              Tel [phone] and Fax [phone] TRANSTHORACIC ECHOCARDIOGRAM REPORT  Patient Name:      RENATA NUÑEZ        Reading Physician:    [id number] Gauthier DO Study Date:        2/28/2024            Ordering Provider:    [id number] GAUTHIER MRN/PID:           [id number]             Fellow: Accession#:        [id number]         Nurse: Date of Birth/Age: 1955 / 68 years Sonographer:          Tanya Bang [facility] Gender:            M                    Additional Staff: Height:            173.00 cm            Admit Date:           2/28/2024 Weight:            85.00 kg             Admission Status:     Inpatient -                                                               Routine BSA / BMI:         1.99 m2 / 28.40      Encounter#:           [id number]                    kg/m2                                         Department Location:  [facility] Non                                                               Invasive Blood Pressure: 138 /99 mmHg Study Type:    TRANSTHORACIC  ECHO (TTE) COMPLETE Diagnosis/ICD: ST elevation (STEMI) myocardial infarction of unspecified                site-I21.3 Indication:    stemi CPT Code:      Echo Complete w Full Doppler-30464 Patient History: Pertinent History: HTN. stemi. Study Detail: The following Echo studies were performed: 2D, M-Mode, Doppler and               color flow. Technically challenging study due to poor acoustic               windows and body habitus. Definity used as a contrast agent for               endocardial border definition. Total contrast used for this               procedure was 2 mL via IV push.  PHYSICIAN INTERPRETATION: Left Ventricle: The left ventricular systolic function is normal, with an estimated ejection fraction of 50-55%. There are no regional wall motion abnormalities. The left ventricular cavity size is normal. Abnormal (paradoxical) septal motion, consistent with left bundle branch block. Spectral Doppler shows an impaired relaxation pattern of left ventricular diastolic filling. Left Atrium: The left atrium is moderately dilated. Right Ventricle: The right ventricle is slightly enlarged. There is normal right ventricular global systolic function. Right Atrium: The right atrium is mildly dilated. Aortic Valve: The aortic valve is trileaflet. There is no evidence of aortic valve regurgitation. The peak instantaneous gradient of the aortic valve is 9.1 mmHg. The mean gradient of the aortic valve is 5.0 mmHg. Mitral Valve: The mitral valve is normal in structure. There is trace mitral valve regurgitation. Tricuspid Valve: The tricuspid valve is structurally normal. There is trace tricuspid regurgitation. The Doppler estimated RVSP is slightly elevated at 32.4 mmHg. Pulmonic Valve: The pulmonic valve is structurally normal. There is physiologic pulmonic valve regurgitation. Pericardium: There is no pericardial effusion noted. Aorta: The aortic root is normal. There is no dilatation of the ascending aorta. There is  no dilatation of the aortic root. In comparison to the previous echocardiogram(s): There are no prior studies on this patient for comparison purposes.  CONCLUSIONS:  1. Left ventricular systolic function is normal with a 50-55% estimated ejection fraction.  2. Spectral Doppler shows an impaired relaxation pattern of left ventricular diastolic filling.  3. Abnormal septal motion consistent with left bundle branch block.  4. The left atrium is moderately dilated.  5. Slightly elevated RVSP. QUANTITATIVE DATA SUMMARY: 2D MEASUREMENTS:                           Normal Ranges: LAs:           3.90 cm    (2.7-4.0cm) IVSd:          1.25 cm    (0.6-1.1cm) LVPWd:         1.40 cm    (0.6-1.1cm) LVIDd:         5.25 cm    (3.9-5.9cm) LVIDs:         3.85 cm LV Mass Index: 191.2 g/m2 LV % FS        26.7 % LA VOLUME:                               Normal Ranges: LA Vol A4C:        78.9 ml    (22+/-6mL/m2) LA Vol A2C:        91.7 ml LA Vol BP:         87.9 ml LA Vol Index A4C:  39.6ml/m2 LA Vol Index A2C:  46.1 ml/m2 LA Vol Index BP:   44.2 ml/m2 LA Area A4C:       22.8 cm2 LA Area A2C:       25.4 cm2 LA Major Axis A4C: 5.6 cm LA Major Axis A2C: 6.0 cm LA Volume Index:   44.2 ml/m2 RA VOLUME BY A/L METHOD:                       Normal Ranges: RA Area A4C: 21.1 cm2 M-MODE MEASUREMENTS:                  Normal Ranges: Ao Root: 3.50 cm (2.0-3.7cm) LAs:     4.70 cm (2.7-4.0cm) AORTA MEASUREMENTS:                      Normal Ranges: Ao Sinus, d: 3.39 cm (2.1-3.5cm) Ao STJ, d:   3.15 cm (1.7-3.4cm) Asc Ao, d:   3.12 cm (2.1-3.4cm) LV SYSTOLIC FUNCTION BY 2D PLANIMETRY (MOD):                     Normal Ranges: EF-A4C View: 57.7 % (>=55%) EF-A2C View: 51.3 % EF-Biplane:  55.2 % LV DIASTOLIC FUNCTION:                               Normal Ranges: MV Peak E:        0.60 m/s    (0.7-1.2 m/s) MV Peak A:        0.89 m/s    (0.42-0.7 m/s) E/A Ratio:        0.67        (1.0-2.2) MV lateral e'     0.06 m/s MV medial e'      0.03 m/s MV A Dur:          158.00 msec E/e' Ratio:       9.60        (<8.0) PulmV Sys Jareth:    42.00 cm/s PulmV Keen Jareth:   23.80 cm/s PulmV S/D Jareth:    1.80 PulmV A Revs Jareth: 42.90 cm/s PulmV A Revs Dur: 87.00 msec MITRAL VALVE:                 Normal Ranges: MV DT: 214 msec (150-240msec) AORTIC VALVE:                                   Normal Ranges: AoV Vmax:                1.51 m/s (<=1.7m/s) AoV Peak P.1 mmHg (<20mmHg) AoV Mean P.0 mmHg (1.7-11.5mmHg) LVOT Max Jareth:            0.86 m/s (<=1.1m/s) AoV VTI:                 33.20 cm (18-25cm) LVOT VTI:                18.30 cm LVOT Diameter:           2.20 cm  (1.8-2.4cm) AoV Area, VTI:           2.10 cm2 (2.5-5.5cm2) AoV Area,Vmax:           2.18 cm2 (2.5-4.5cm2) AoV Dimensionless Index: 0.55  RIGHT VENTRICLE: RV Basal 4.61 cm RV Mid   3.92 cm RV Major 7.8 cm TAPSE:   25.2 mm TRICUSPID VALVE/RVSP:                             Normal Ranges: Peak TR Velocity: 2.71 m/s Est. RA Pressure: 3 mmHg RV Syst Pressure: 32.4 mmHg (< 30mmHg) IVC Diam:         1.64 cm PULMONIC VALVE:                         Normal Ranges: PV Accel Time: 100 msec (>120ms) PV Max Jareth:    1.0 m/s  (0.6-0.9m/s) PV Max P.2 mmHg Pulmonary Veins: PulmV A Revs Dur: 87.00 msec PulmV A Revs Jareth: 42.90 cm/s PulmV Keen Jareth:   23.80 cm/s PulmV S/D Jareth:    1.80 PulmV Sys Jareth:    42.00 cm/s  [id number] Junaid Khanna DO Electronically signed on 2024 at 11:39:55 AM  ** Final **     XR chest 1 view    Result Date: 2024  Interpreted By:  Erica Salmeron, STUDY: XR CHEST 1 VIEW;  2024 11:21 am   INDICATION: Signs/Symptoms:CP.   COMPARISON: None.   ACCESSION NUMBER(S): [id number]   ORDERING CLINICIAN: ROCK MCKINNEY   FINDINGS: Artifact from overlying monitoring leads noted. No focal infiltrate, pleural effusion or pneumothorax identified. The cardiac silhouette is within normal limits for size.       No acute cardiopulmonary process radiographically.   MACRO: None.   Signed by:  Erica Salmeron 2/28/2024 11:26 AM Dictation workstation:   [id number]          Past Cardiology Tests (Last 3 Years):  EKG:  Results for orders placed during the hospital encounter of 02/28/24    ECG 12 Lead (Preliminary)  This result has not been signed. Information might be incomplete.    Narrative  Normal sinus rhythm  Left axis deviation  Left ventricular hypertrophy with QRS widening and repolarization abnormality  Abnormal ECG  When compared with ECG of 28-FEB-2024 16:58,  Premature atrial complexes are no longer Present  Minimal criteria for Septal infarct are no longer Present  T wave inversion less evident in Anterior leads  QT has lengthened      ECG 12 Lead    Narrative  Sinus rhythm with Premature atrial complexes  Left axis deviation  Left ventricular hypertrophy with QRS widening and repolarization abnormality  Cannot rule out Septal infarct , age undetermined  Abnormal ECG    Confirmed by Eliseo Le ([id number]) on 2/29/2024 12:23:49 PM      Electrocardiogram, 12-lead PRN ACS symptoms    Narrative  Sinus rhythm with marked sinus arrhythmia  Left bundle branch block  Abnormal ECG  When compared with ECG of 28-FEB-2024 09:06, (unconfirmed)  Left bundle branch block is now Present  See ED provider note for full interpretation and clinical correlation  Confirmed by Ashley Gilbert ([id number]) on 2/29/2024 12:04:36 PM      ECG 12 Lead    Narrative  Normal sinus rhythm  Left ventricular hypertrophy with QRS widening ( Sokolow-Pineda , Edwin product )  T wave abnormality, consider lateral ischemia  Abnormal ECG  No previous ECGs available  See ED provider note for full interpretation and clinical correlation  Confirmed by Ashley Gilbert ([id number]) on 2/29/2024 12:03:42 PM      ECG 12 lead    Narrative  Normal sinus rhythm  Left ventricular hypertrophy with QRS widening ( Sokolow-Pineda , Edwin product )  Cannot rule out Septal infarct , age undetermined  T wave abnormality, consider inferior ischemia  Abnormal ECG  When  compared with ECG of 28-FEB-2024 09:28, (unconfirmed)  Left bundle branch block is no longer Present  Minimal criteria for Septal infarct are now Present  See ED provider note for full interpretation and clinical correlation  Confirmed by Ashley Gilbert ([id number]) on 2/29/2024 12:04:21 PM    Echo:  Results for orders placed during the hospital encounter of 02/28/24    Transthoracic Echo (TTE) Limited    Narrative  [facility], [address] [place] [address]  Tel [phone] and Fax [phone]    TRANSTHORACIC ECHOCARDIOGRAM REPORT      Patient Name:      RENATA NUÑEZ        Reading Physician:    [id number] Durga Wasserman DO  Study Date:        2/29/2024            Ordering Provider:    [id number] OMID MARTI  MRN/PID:           [id number]             Fellow:  Accession#:        [id number]         Nurse:  Date of Birth/Age: 1955 / 68 years Sonographer:          Lefty Kaur RDCS  Gender:            M                    Additional Staff:  Height:            172.72 cm            Admit Date:  Weight:            84.82 kg             Admission Status:     Inpatient - STAT  BSA / BMI:         1.99 m2 / 28.43      Encounter#:           [id number]  kg/m2  Department Location:  [facility] Cath  Lab  Blood Pressure: 175 /90 mmHg    Study Type:    TRANSTHORACIC ECHO (TTE) LIMITED  Diagnosis/ICD: Other pericardial effusion (noninflammatory)-I31.39  Indication:    Effusion  CPT Code:      Echo Limited-37773; Doppler Limited-12743; Color Doppler-15215    Patient History:  Pertinent History: CAD and HTN.    Study Detail: The following Echo studies were performed: 2D and Doppler.  Technically challenging study due to patient lying in supine  position and body habitus.      PHYSICIAN INTERPRETATION:  Left Ventricle: The left ventricular systolic function is mildly to moderately decreased, with an estimated ejection fraction of 40-45%. There are no regional wall motion abnormalities. The left ventricular cavity  size was not assessed. Left ventricular diastolic filling was not assessed.  LV Wall Scoring:  The apical septal segment, apical lateral segment, and apex are akinetic. The  mid and apical inferior wall, mid anterolateral segment, and apical anterior  segment are hypokinetic.    Left Atrium: The left atrium was not assessed.  Right Ventricle: The right ventricle was not assessed. Right ventricular systolic function not assessed.  Right Atrium: The right atrium was not assessed.  Aortic Valve: The aortic valve was not assessed. Aortic valve regurgitation was not assessed.  Mitral Valve: The mitral valve was not assessed. Mitral valve regurgitation was not assessed.  Tricuspid Valve: The tricuspid valve was not assessed. Tricuspid regurgitation was not assessed.  Pulmonic Valve: The pulmonic valve was not assessed. Pulmonic valve regurgitation was not assessed.  Pericardium: There is a trivial to small pericardial effusion.  Aorta: The aortic root was not assessed.      CONCLUSIONS:  1. Left ventricular systolic function is mildly to moderately decreased with a 40-45% estimated ejection fraction.  2. Multiple segmental abnormalities exist. See findings.  3. Trivial to small pericardial effusion along anterior and lateral LV wall.    QUANTITATIVE DATA SUMMARY:  LV SYSTOLIC FUNCTION BY 2D PLANIMETRY (MOD):  Normal Ranges:  EF-A4C View: 41.6 % (>=55%)  EF-A2C View: 47.5 %  EF-Biplane:  43.3 %      [id number] Durga Wasserman DO  Electronically signed on 2/29/2024 at 8:07:12 PM      Wall Scoring        ** Final **      Transthoracic Echo (TTE) Complete    [facility], [address] [place] [address]  Tel [phone] and Fax [phone]    TRANSTHORACIC ECHOCARDIOGRAM REPORT      Patient Name:      RENATA NUÑEZ        Reading Physician:    [id number] Gauthier DO  Study Date:        2/28/2024            Ordering Provider:    Joan GAUTHIER  MRN/PID:           [id number]              Fellow:  Accession#:        [id number]         Nurse:  Date of Birth/Age: 1955 / 68 years Sonographer:          Tayna Bang RDCS  Gender:            M                    Additional Staff:  Height:            173.00 cm            Admit Date:           2/28/2024  Weight:            85.00 kg             Admission Status:     Inpatient -  Routine  BSA / BMI:         1.99 m2 / 28.40      Encounter#:           [id number]  kg/m2  Department Location:  [facility] Non  Invasive  Blood Pressure: 138 /99 mmHg    Study Type:    TRANSTHORACIC ECHO (TTE) COMPLETE  Diagnosis/ICD: ST elevation (STEMI) myocardial infarction of unspecified  site-I21.3  Indication:    stemi  CPT Code:      Echo Complete w Full Doppler-34855    Patient History:  Pertinent History: HTN. stemi.    Study Detail: The following Echo studies were performed: 2D, M-Mode, Doppler and  color flow. Technically challenging study due to poor acoustic  windows and body habitus. Definity used as a contrast agent for  endocardial border definition. Total contrast used for this  procedure was 2 mL via IV push.      PHYSICIAN INTERPRETATION:  Left Ventricle: The left ventricular systolic function is normal, with an estimated ejection fraction of 50-55%. There are no regional wall motion abnormalities. The left ventricular cavity size is normal. Abnormal (paradoxical) septal motion, consistent with left bundle branch block. Spectral Doppler shows an impaired relaxation pattern of left ventricular diastolic filling.  Left Atrium: The left atrium is moderately dilated.  Right Ventricle: The right ventricle is slightly enlarged. There is normal right ventricular global systolic function.  Right Atrium: The right atrium is mildly dilated.  Aortic Valve: The aortic valve is trileaflet. There is no evidence of aortic valve regurgitation. The peak instantaneous gradient of the aortic valve is 9.1 mmHg. The mean gradient of the aortic valve is 5.0 mmHg.  Mitral Valve:  The mitral valve is normal in structure. There is trace mitral valve regurgitation.  Tricuspid Valve: The tricuspid valve is structurally normal. There is trace tricuspid regurgitation. The Doppler estimated RVSP is slightly elevated at 32.4 mmHg.  Pulmonic Valve: The pulmonic valve is structurally normal. There is physiologic pulmonic valve regurgitation.  Pericardium: There is no pericardial effusion noted.  Aorta: The aortic root is normal. There is no dilatation of the ascending aorta. There is no dilatation of the aortic root.  In comparison to the previous echocardiogram(s): There are no prior studies on this patient for comparison purposes.      CONCLUSIONS:  1. Left ventricular systolic function is normal with a 50-55% estimated ejection fraction.  2. Spectral Doppler shows an impaired relaxation pattern of left ventricular diastolic filling.  3. Abnormal septal motion consistent with left bundle branch block.  4. The left atrium is moderately dilated.  5. Slightly elevated RVSP.    QUANTITATIVE DATA SUMMARY:  2D MEASUREMENTS:  Normal Ranges:  LAs:           3.90 cm    (2.7-4.0cm)  IVSd:          1.25 cm    (0.6-1.1cm)  LVPWd:         1.40 cm    (0.6-1.1cm)  LVIDd:         5.25 cm    (3.9-5.9cm)  LVIDs:         3.85 cm  LV Mass Index: 191.2 g/m2  LV % FS        26.7 %    LA VOLUME:  Normal Ranges:  LA Vol A4C:        78.9 ml    (22+/-6mL/m2)  LA Vol A2C:        91.7 ml  LA Vol BP:         87.9 ml  LA Vol Index A4C:  39.6ml/m2  LA Vol Index A2C:  46.1 ml/m2  LA Vol Index BP:   44.2 ml/m2  LA Area A4C:       22.8 cm2  LA Area A2C:       25.4 cm2  LA Major Axis A4C: 5.6 cm  LA Major Axis A2C: 6.0 cm  LA Volume Index:   44.2 ml/m2    RA VOLUME BY A/L METHOD:  Normal Ranges:  RA Area A4C: 21.1 cm2    M-MODE MEASUREMENTS:  Normal Ranges:  Ao Root: 3.50 cm (2.0-3.7cm)  LAs:     4.70 cm (2.7-4.0cm)    AORTA MEASUREMENTS:  Normal Ranges:  Ao Sinus, d: 3.39 cm (2.1-3.5cm)  Ao STJ, d:   3.15 cm (1.7-3.4cm)  Asc Ao, d:    3.12 cm (2.1-3.4cm)    LV SYSTOLIC FUNCTION BY 2D PLANIMETRY (MOD):  Normal Ranges:  EF-A4C View: 57.7 % (>=55%)  EF-A2C View: 51.3 %  EF-Biplane:  55.2 %    LV DIASTOLIC FUNCTION:  Normal Ranges:  MV Peak E:        0.60 m/s    (0.7-1.2 m/s)  MV Peak A:        0.89 m/s    (0.42-0.7 m/s)  E/A Ratio:        0.67        (1.0-2.2)  MV lateral e'     0.06 m/s  MV medial e'      0.03 m/s  MV A Dur:         158.00 msec  E/e' Ratio:       9.60        (<8.0)  PulmV Sys Jareth:    42.00 cm/s  PulmV Keen Jareth:   23.80 cm/s  PulmV S/D Jareth:    1.80  PulmV A Revs Jareth: 42.90 cm/s  PulmV A Revs Dur: 87.00 msec    MITRAL VALVE:  Normal Ranges:  MV DT: 214 msec (150-240msec)    AORTIC VALVE:  Normal Ranges:  AoV Vmax:                1.51 m/s (<=1.7m/s)  AoV Peak P.1 mmHg (<20mmHg)  AoV Mean P.0 mmHg (1.7-11.5mmHg)  LVOT Max Jareth:            0.86 m/s (<=1.1m/s)  AoV VTI:                 33.20 cm (18-25cm)  LVOT VTI:                18.30 cm  LVOT Diameter:           2.20 cm  (1.8-2.4cm)  AoV Area, VTI:           2.10 cm2 (2.5-5.5cm2)  AoV Area,Vmax:           2.18 cm2 (2.5-4.5cm2)  AoV Dimensionless Index: 0.55      RIGHT VENTRICLE:  RV Basal 4.61 cm  RV Mid   3.92 cm  RV Major 7.8 cm  TAPSE:   25.2 mm    TRICUSPID VALVE/RVSP:  Normal Ranges:  Peak TR Velocity: 2.71 m/s  Est. RA Pressure: 3 mmHg  RV Syst Pressure: 32.4 mmHg (< 30mmHg)  IVC Diam:         1.64 cm    PULMONIC VALVE:  Normal Ranges:  PV Accel Time: 100 msec (>120ms)  PV Max Jareth:    1.0 m/s  (0.6-0.9m/s)  PV Max P.2 mmHg    Pulmonary Veins:  PulmV A Revs Dur: 87.00 msec  PulmV A Revs Jareth: 42.90 cm/s  PulmV Keen Jareth:   23.80 cm/s  PulmV S/D Jareth:    1.80  PulmV Sys Jareth:    42.00 cm/s      [id number] Junaid Khanna DO  Electronically signed on 2024 at 11:39:55 AM        ** Final **    Ejection Fractions:  LV biplane EF   Date/Time Value Ref Range Status   2024 07:51 PM 43 %    2024 11:13 AM 55 %      Cath:  Results for orders  placed during the hospital encounter of 02/28/24    Cardiac catheterization - coronary    Narrative  =  [facility], Cath Lab, [address] [place] [address]    Cardiovascular Catheterization Report    Patient Name:     RENATA NUÑEZ       Performing Physician:  [id number] Sotelo MD  Study Date:       2/29/2024           Verifying Physician:   [id number] Sotelo MD  MRN/PID:          [id number]            Cardiologist/Co-scrub:  Accession#:       [id number]        Ordering Provider:     [id number] LILLIAM GAUTHIER  Date of           1955 / 68      Fellow:                [id number] Lexy Albright  Birth/Age:        years                                      MD  Gender:           M                   Fellow:  Encounter#:       [id number]      Study:            Left Heart Cath no LV  Additional Study: PCI - Percutaneous Coronary Intervention  Additional Study: FFR - Fractional Flow Reserve  Additional Study: OCT - Optical Coherence Tomography      Indications:  RENATA NUÑEZ is a 68 year old male who presents with coronary artery disease, prior myocardial infarction, prior percutaneous coronary intervention, diabetes, dyslipidemia, hypertension, HFrEF and a chest pain assessment of typical angina. Worsening angina.  Medical History:  Stress test performed: No. CTA performed: No. Joe accessed: No. LVEF  Assessed: No. LVEF = 50-55%.  Cardiac arrest: No.  Cardiac surgical consult: No.  Cardiovascular instability: No.  Frailty status of patient entering lab: 4 = Vulnerable.      Procedure Description:  After infiltration with 2% Lidocaine, the right radial artery was cannulated with a modified Seldinger technique. Subsequently a 6 Mexican sheath was placed retrograde in the right radial artery. Selective coronary catheterization was performed using a 5 Fr catheter(s) exchanged over a guide wire to cannulate the coronary arteries. A JL 4 tip catheter was used for left coronary injections. A JR 4 tip  catheter was used for right coronary injections.  Additional catheter(s) used to visualize the coronary arteries were: EBU 3.5 and JL 4. Multiple injections of contrast were made into the left and right coronary arteries with angiograms recorded in multiple projections. After completion of the procedure, the arterial sheath was pulled and a TR Band Radial Compression Device was utilized to obtain patent hemostasis.    Coronary Angiography:  The coronary circulation is co-dominant.    Left Main Coronary Artery:  The left main coronary artery is a normal caliber vessel. The left main arises normally from the left coronary sinus of Valsalva and bifurcates into the LAD and circumflex coronary arteries. The left main coronary artery showed no significant disease or stenosis greater than 30%.    Left Anterior Descending Coronary Artery Distribution:  The left anterior descending coronary artery is a normal caliber vessel. The LAD arises normally from the left main coronary artery. The LAD demonstrated severe atherosclerotic disease. The proximal to mid left anterior descending coronary artery showed 60-70. An additional lesion, located at the distal third of the distal left anterior descending coronary artery, revealed 50% stenosis. The 1st diagonal branch showed no significant disease or stenosis greater than 30%. The 2nd diagonal branch demonstrated no significant disease or stenosis greater than 30%.    Circumflex Coronary Artery Distribution:  The circumflex coronary artery is a normal caliber vessel. The circumflex arises normally from the left main coronary artery and terminates in the AV groove. The circumflex revealed mild proximal irregularities. The 1st obtuse marginal branch is a medium-sized caliber vessel. The 1st obtuse marginal branch showed no significant disease or stenosis greater than 30%. The 2nd obtuse marginal branch is a large caliber vessel. The 2nd obtuse marginal branch demonstrated mild proximal  irregularities. The 3rd obtuse marginal branch is a medium-sized caliber vessel. The 3rd obtuse marginal branch revealed no significant disease or stenosis greater than 30%.    Right Coronary Artery Distribution:    The right coronary artery is a normal caliber vessel. The RCA arises normally from the right sinus of Valsalva. The RCA showed mild to moderate in-stent restenosis. The proximal to mid right coronary artery showed 40% in-stent restenosis.  The right posterior descending artery is a large caliber vessel. The right posterior descending artery showed moderate atherosclerotic disease. The mid to distal right posterior descending artery revealed 40% stenosis.    Coronary Interventions:  Angiography reveals a 70% stenosis of the proximal to mid left anterior descending coronary artery. Pre-intervention NURIA flow was 3. Optical Coherence Tomography (OCT) was performed on the lesion within the proximal to mid left anterior descending. The plaque seen was predominantly fibro-calcific, no thrombus was visualized and no dissection was revealed. This lesion was interpreted to be significant. Percutaneous coronary intervention was performed within the proximal to mid left anterior descending. The vessel was pre-dilated using a non-compliant balloon 3.0 mm x 12 mm at 18 HUY. Wendell [place] 3.5 mm x 20 mm was advanced to the lesion and implanted at 12 HUY. The stent was post dilated using a non-compliant balloon 3.5 mm x 12 mm at 22 HUY. The stenosis was successfully reduced from 70% to <10%. Post-intervention NURIA flow was 3.    Coronary Intervention Comments:  Patient was loaded on aspirin and Brilinta. Additional heparin was given for an ACT greater than 300 during the entire case.    A 6 Divehi EBU 3.75 guiding catheter was used to engage the LMCA after which an FFR wire was passed into the LAD. The FFR wire was equalized with the transducer just outside the LMCA and subsequently parked into the distal LAD. FFR  value was 0.76, RFR was 0.90 and Pd/Pa was 0.90. Intracoronary nitroglycerin was given. A [employer] OCT cath was advance to image the lesion and demonstrated fibrocalcific disease. A run-through wire was used to wire the diagonal 2 for protection.    Predilation was done of the mid LAD lesion with a 3.0 X 12 mm NC balloon at 18 atms. A 3.5 X 30 mm Webster Gregory stent was delivered and carefully placed. This was deployed at nominal pressures of 12 atms.  After deployment of the stent, we noted loss of diagonal 2 which took off from within the diseased area of LAD. We removed the jailed run through wire from the diagonal 2 and did serial postdilatations of LAD stent with a 3.5 X 12 mm NC balloons at 12 atms to open the stent struts. We subsequently tried to rewire the diagonal 2 with an intent to balloon the vessel and re-establish flow. We used run through followed by Whisper wire but were unable to successfully cross into the true lumen of the diagonal 2, suspect that there was dissection from the LAD lesion post dilation extending into the D2 on the top of plaque shift. Also there was mild dissection of diseased distal LAD, we dottered the balloon and got little better, NURIA III flow.  Finally we post dilated the mid portion of LAD stent (at the takeoff of diagonal 2) with a 3.5 X 12 NC ballon at 22 atms.    Final angiography demonstrated a reasonable result. The guiding catheter was removed over a J-wire. TR band was used to achieve hemostasis of the right radial artery. The patient tolerated the procedure without difficulty.    Coronary Lesion Summary:  Vessel        Stenosis               Vessel Segment  LAD             60-70               proximal to mid  LAD         50% stenosis       distal third of the distal  RCA    40% in-stent restenosis      proximal to mid  RPDA        40% stenosis             mid to distal      Hemo Personnel:  +----------------+---------+  Name            Duty        +----------------+---------+  Calvin Sotelo MD 1  +----------------+---------+      Hemodynamic Pressures:    +----+--------------------+----------+-------------+--------------+---------+  Site     Date Time      Phase NameSystolic mmHgDiastolic mmHgMean mmHg  +----+--------------------+----------+-------------+--------------+---------+    AO2/29/2024 4:26:20 PM  AIR REST          111            75       93  +----+--------------------+----------+-------------+--------------+---------+    AO2/29/2024 5:04:41 PM  AIR REST          171            85      113  +----+--------------------+----------+-------------+--------------+---------+    AO2/29/2024 5:16:53 PM  AIR REST          185            88      123  +----+--------------------+----------+-------------+--------------+---------+    AO2/29/2024 5:25:57 PM  AIR REST          135            95      115  +----+--------------------+----------+-------------+--------------+---------+    AO2/29/2024 5:30:43 PM  AIR REST          127            88      108  +----+--------------------+----------+-------------+--------------+---------+    AO2/29/2024 5:36:18 PM  AIR REST          202           117      154  +----+--------------------+----------+-------------+--------------+---------+      Complications:  Loss of diagonal 2 with failure to rescue despite multiple attempts [likely diagonal 2 had dissection on the top of plaque shift post LAD PCI]. There was also a small dissection of the diseased apical LAD but NURIA-3 flow.    Cardiac Cath Post Procedure Notes:  Post Procedure Diagnosis: See below.  Blood Loss:               Estimated blood loss during the procedure was 10 mls.  Specimens Removed:        Number of specimen(s) removed: none.    ____________________________________________________________________________________  CONCLUSIONS:  1. Hemodynamically significant [FFR + at 0.76] heavily calcified 60-70% mid  LAD stenosis. Otherwise mild to moderate RCA disease, mild RCA ISR, mild CAD elsewhere in the left_dominant system.  2. Status post successful OCT_guided PCI to mid LAD using 3.5 x 30 mm AZEEM [postdilated using 3.5 NC balloon], this was complicated by loss of diagonal 2 with failure to rescue despite multiple attempts [likely diagonal 2 had dissection on the top of plaque shift post LAD PCI]. There was also a small dissection of the diseased apical LAD but NURIA-3 flow. TTE in the lab with no effusion.  3. Will observe in the ICU overnight due to residual 1-2 out of 10 chest pain and uncontrolled hypertension requiring nitroglycerin drip.  4. Aspirin 81 mg once daily for life and ticagrelor 90 mg twice daily for at least 12 months.    ICD 10 Codes:  Refractory angina pectoris-I20.2    CPT Codes:  Coronary Angiography S&I only ([facility])([facility])-60673; FFR, initial Vessel (PCI)-07202; Stent w angioplasty Left Anterior Descending single major Artery branch (PCI)-00088.LD; OCT Initial Vessel (coronary native vessel or graft) during diagnostic evaluation and/or therapeutic intervention, initial vessel-48348; Moderate Sedation Services initial 15 minutes patient >5 years-37206; Moderate Sedation Services 1st additional 15 minutes patient >5 years-79502; Moderate Sedation Services 2nd additional 15 minutes patient >5 years-32154; Moderate Sedation Services 3rd additional 15 minutes patient >5 years-34401; Complicated/Unusual Procedure-MOD22    [id number] Calvin Sotelo MD  Performing Physician  Electronically signed by [id number] Calvin Sotelo MD on 2/29/2024 at 8:29:25 PM          ** Final **    Stress Test:  No results found for this or any previous visit.    Cardiac Imaging:  No results found for this or any previous visit.      Inpatient Medications:  Scheduled medications   Medication Dose Route Frequency    allopurinol  300 mg oral Daily    aspirin  81 mg oral Daily    atorvastatin  80 mg oral Nightly    carvedilol  25 mg oral BID with  meals    [START ON 3/3/2024] clopidogrel  75 mg oral Daily    dapagliflozin propanediol  10 mg oral q AM    docusate sodium  100 mg oral BID    ferrous gluconate  324 mg oral Daily    furosemide  20 mg oral Daily    hydrALAZINE  100 mg oral TID    [Held by provider] isosorbide mononitrate ER  120 mg oral q AM    isosorbide mononitrate ER  120 mg oral Daily    oxygen   inhalation Continuous - 02/gases    perflutren lipid microspheres  0.5-10 mL of dilution intravenous Once in imaging    perflutren lipid microspheres  0.5-10 mL of dilution intravenous Once in imaging    perflutren protein A microsphere  0.5 mL intravenous Once in imaging    rivaroxaban  20 mg oral Daily with evening meal    spironolactone  50 mg oral Daily    sulfur hexafluoride microsphr  2 mL intravenous Once in imaging    tamsulosin  0.4 mg oral Daily     PRN medications   Medication    acetaminophen    Or    acetaminophen    Or    acetaminophen    hydrALAZINE     Continuous Medications   Medication Dose Last Rate    nitroglycerin  5-200 mcg/min Stopped (03/01/24 1545)       Physical Exam:  General:  Patient is awake, alert, and oriented.  Patient is in no acute distress.  HEENT:  Pupils equal and reactive.  Normocephalic.  Moist mucosa.    Neck:  No thyromegaly.  Normal Jugular Venous Pressure.  Cardiovascular:  Regular rate and rhythm.  Normal S1 and S2.  Pulmonary:  Clear to auscultation bilaterally.  Abdomen:  Soft. Non-tender.   Non-distended.  Positive bowel sounds.  Lower Extremities:  2+ pedal pulses. No LE edema.  Neurologic:  Cranial nerves intact.  No focal deficit.   Skin: Skin warm and dry, normal skin turgor.   Psychiatric: Normal affect.     Assessment/Plan     Marco Henderson III is a 68 y.o. M presenting with a pertinent medical history for coronary artery disease post PCI x 2 ( RCA Intervention X 2 ;status post  Cypher stent in the proximal RCA 12/2005 (3.5 X 13 mm), Taxus stent in the mid RCA 7/2007 (3.0 X 20 mm), history of  "typical atrial flutter status post ablation 10/2022 with [id number]  Score at least 4, not on anticoagulation as of roughly November 2023 secondary to Pain Injection, never restarted, history of chronic heart failure with reduced ejection fraction 45% per last echocardiogram 10/2022 at [facility], Not on GDMT , diabetes mellitus type 2 on oral medications, essential hypertension who presented to the emergency department with chest discomfort.  Cardiology has been consulted for \"Chest Pain\".    Echocardiogram showed normal LV systolic function with no wall motion abnormalities. Troponin remain negative.        Chest pain concerning for angina     S/p left heart cath with PCI to LAD. 2/29.   Procedure complicated by loss of diagonal 2 with failure to rescue despite multiple attempts.   [likely diagonal 2 had dissection on the top of plaque shift post LAD PCI].  There was also a small dissection of the diseased apical LAD but NURIA-3 flow. Transferred to ICU for closer monitoring.     HD stable    Now in stepdown   Off nitroglycerin gtt   S/P Plavix load today ( transition from Brilinta)  Cont Xarelto  ASA 81 mg x 7 days then stop  LDL 44        2.Chronic Systolic Heart Failure care.  LVEF 40-to 45% on TTE this admission    Continue carvedilol 25 mg BID   ACE/ARB/ARNI: NONE -- Documented Intolerance to ACE and ARB with Swelling with both classes  MRA: Aldactone   SGLT2-inhibitor: Farxiga 10   Diuretic Furosemide 20 mg BID   Historically -Hydralazine 100 mg TID (resumed) / Isosorbide Mononitrate 120 mg (resumed)       3. HTN cont home meds     4. Hx of flutter and RFA  On Xarelto resumed   On Coreg      Recs    Ok to DC from CV perspective   Cont current CV meds  Triple therapy with ASA 81 mg, Plavix and Xarelto x 7 days then stop ASA, and cont Plavix/ Xarelto.  Requesting follow up with Dr. Khanna in 2 weeks.              Code Status:  Full Code     George Christian, APRN-CNP    Code Status:  Full " Code            Nisha Subramanian, APRN-CNP

## 2024-03-06 LAB
ACT BLD: 321 SEC (ref 89–169)
ACT BLD: 367 SEC (ref 89–169)
ACT BLD: 368 SEC (ref 89–169)

## 2024-03-07 NOTE — DOCUMENTATION CLARIFICATION NOTE
"    PATIENT:               RENATA NUÑEZ  ACCT #:                  [id number]  MRN:                       [id number]  :                       1955  ADMIT DATE:       2024 8:56 AM  DISCH DATE:        3/2/2024 4:00 PM  RESPONDING PROVIDER #:        [id number]          PROVIDER RESPONSE TEXT:    Acute combined systolic and diastolic heart failure ruled out, Chronic systolic heart failure only    CDI QUERY TEXT:    [facility]_CV CHF    Instruction:    Based on your assessment of the patient and the clinical information, please provide the requested documentation by clicking on the appropriate radio button and enter any additional information if prompted.    Question: Please clarify the diagnosis of Congestive Heart Failure    When answering this query, please exercise your independent professional judgment. The fact that a question is being asked, does not imply that any particular answer is desired or expected.    The patient's clinical indicators include:  Clinical Information: 67 y/o male presented with chest pain    Documented Diagnosis: Per Post-Procedure note  \"acute combined systolic and diastolic heart failure\".  Per all the other notes \"chronic heart failure with reduced ejection fraction\", \"chronic systolic heart failure\"    Clinical Indicators and Documentation:  -Vital Signs: on arrival T 36.9 HR 79 RR 18 /96  -ECHO: LVEF 50-55 percent, impaired relaxation pattern of left ventricular diastolic filling, left atrium moderately dilated, slightly elevated RVSP  -CXR: no acute cardiopulmonary process  -BNP: n/a  -Physical exam findings:  -Lung Sounds: clear  -JVD: none  -Peripheral Edema: none  -Supplemental Oxygen: none  -Cardiac Consult: chronic systolic heart failure    Treatment: Farxiga, lasix, spironolactone    Risk Factors: age, CAD, CHF  Options provided:  -- Acute combined systolic and diastolic heart failure ruled out, Chronic systolic heart failure only  -- Other - I will add my own " diagnosis  -- Refer to Clinical Documentation Reviewer    Query created by: Lelo Hernandez on 3/7/2024 10:28 AM      Electronically signed by:  LILLIAM EVANGELISTA 3/7/2024 1:32 PM

## 2024-03-08 NOTE — DOCUMENTATION CLARIFICATION NOTE
"    PATIENT:               RENATA NUÑEZ  ACCT #:                  [id number]  MRN:                       [id number]  :                       1955  ADMIT DATE:       2024 8:56 AM  DISCH DATE:        3/2/2024 4:00 PM  RESPONDING PROVIDER #:        [id number]          PROVIDER RESPONSE TEXT:    NSTEMI due to in-stent restenosis of RCA    CDI QUERY TEXT:    [facility]_In-stent Restenosis    Instruction:    Based on your assessment of the patient and the clinical information, please provide the requested documentation by clicking on the appropriate radio button and enter any additional information if prompted.    Question: Please further clarify the relationship between the in-stent stenosis and CDI TO ENTER    When answering this query, please exercise your independent professional judgment. The fact that a question is being asked, does not imply that any particular answer is desired or expected.    The patient's clinical indicators include:  Clinical Information: 69 y/o male presented with chest pain. Admitted with NSTEMI.    Clinical Indicators: Troponin  14, 8  EKG: NSR with left ventricular hypertrophy, T wave abnormality, consider inferior ischemia  Per Cardiac Catheterization report: \"heavily calcified 60-70 percent mid LAD stenosis, mild to moderate RCA disease, mild RCA ISR, mild CAD elsewhere\"    Treatment: LHC and PCI with AZEEM placed    Risk Factors: CAD post PCI x 2, atrial flutter, chronic systolic heart failure, DM, HTN  Options provided:  -- NSTEMI due to in-stent restenosis of RCA  -- NSTEMI multifactorial due to in-stent restenosis of RCA and occlusion of native vessel  -- Other - I will add my own diagnosis  -- Refer to Clinical Documentation Reviewer    Query created by: Lelo Hernandez on 3/7/2024 10:45 AM      Electronically signed by:  LILLIAM GAUTHIER DO 3/8/2024 2:29 PM          "

## 2024-03-11 ENCOUNTER — OFFICE VISIT (OUTPATIENT)
Dept: CARDIOLOGY | Facility: CLINIC | Age: 69
End: 2024-03-11
Payer: MEDICARE

## 2024-03-11 VITALS
WEIGHT: 185 LBS | SYSTOLIC BLOOD PRESSURE: 134 MMHG | HEART RATE: 79 BPM | DIASTOLIC BLOOD PRESSURE: 78 MMHG | OXYGEN SATURATION: 97 % | BODY MASS INDEX: 28.04 KG/M2 | HEIGHT: 68 IN

## 2024-03-11 DIAGNOSIS — I48.0 PAROXYSMAL ATRIAL FIBRILLATION (MULTI): Primary | ICD-10-CM

## 2024-03-11 DIAGNOSIS — I25.83 CORONARY ATHEROSCLEROSIS DUE TO LIPID RICH PLAQUE (CODE): ICD-10-CM

## 2024-03-11 DIAGNOSIS — I44.7 LBBB (LEFT BUNDLE BRANCH BLOCK): ICD-10-CM

## 2024-03-11 DIAGNOSIS — I50.42 CHRONIC COMBINED SYSTOLIC AND DIASTOLIC CHF (CONGESTIVE HEART FAILURE) (MULTI): ICD-10-CM

## 2024-03-11 DIAGNOSIS — I10 BENIGN HYPERTENSION: ICD-10-CM

## 2024-03-11 DIAGNOSIS — I21.4 NON-ST ELEVATION MYOCARDIAL INFARCTION (NSTEMI), SUBENDOCARDIAL INFARCTION, SUBSEQUENT EPISODE OF CARE (MULTI): ICD-10-CM

## 2024-03-11 PROCEDURE — 3078F DIAST BP <80 MM HG: CPT | Performed by: INTERNAL MEDICINE

## 2024-03-11 PROCEDURE — 3048F LDL-C <100 MG/DL: CPT | Performed by: INTERNAL MEDICINE

## 2024-03-11 PROCEDURE — 1126F AMNT PAIN NOTED NONE PRSNT: CPT | Performed by: INTERNAL MEDICINE

## 2024-03-11 PROCEDURE — 93010 ELECTROCARDIOGRAM REPORT: CPT | Performed by: INTERNAL MEDICINE

## 2024-03-11 PROCEDURE — 3044F HG A1C LEVEL LT 7.0%: CPT | Performed by: INTERNAL MEDICINE

## 2024-03-11 PROCEDURE — 1111F DSCHRG MED/CURRENT MED MERGE: CPT | Performed by: INTERNAL MEDICINE

## 2024-03-11 PROCEDURE — 93005 ELECTROCARDIOGRAM TRACING: CPT | Performed by: INTERNAL MEDICINE

## 2024-03-11 PROCEDURE — 99215 OFFICE O/P EST HI 40 MIN: CPT | Performed by: INTERNAL MEDICINE

## 2024-03-11 PROCEDURE — 1159F MED LIST DOCD IN RCRD: CPT | Performed by: INTERNAL MEDICINE

## 2024-03-11 PROCEDURE — 3075F SYST BP GE 130 - 139MM HG: CPT | Performed by: INTERNAL MEDICINE

## 2024-03-11 RX ORDER — CLOPIDOGREL BISULFATE 75 MG/1
75 TABLET ORAL DAILY
Qty: 30 TABLET | Refills: 1 | Status: SHIPPED | OUTPATIENT
Start: 2024-03-11 | End: 2024-05-10

## 2024-03-11 RX ORDER — NITROGLYCERIN 0.4 MG/1
0.4 TABLET SUBLINGUAL AS NEEDED
Qty: 90 TABLET | Refills: 3 | Status: SHIPPED | OUTPATIENT
Start: 2024-03-11 | End: 2025-03-11

## 2024-03-11 RX ORDER — ISOSORBIDE MONONITRATE 120 MG/1
120 TABLET, EXTENDED RELEASE ORAL EVERY MORNING
Qty: 90 TABLET | Refills: 3 | Status: SHIPPED | OUTPATIENT
Start: 2024-03-11 | End: 2025-03-11

## 2024-03-11 RX ORDER — ATORVASTATIN CALCIUM 80 MG/1
80 TABLET, FILM COATED ORAL NIGHTLY
Qty: 30 TABLET | Refills: 1 | Status: SHIPPED | OUTPATIENT
Start: 2024-03-11 | End: 2024-05-10

## 2024-03-11 RX ORDER — CARVEDILOL 25 MG/1
25 TABLET ORAL
Qty: 180 TABLET | Refills: 3 | Status: SHIPPED | OUTPATIENT
Start: 2024-03-11 | End: 2025-03-11

## 2024-03-11 RX ORDER — HYDRALAZINE HYDROCHLORIDE 100 MG/1
100 TABLET, FILM COATED ORAL
Qty: 270 TABLET | Refills: 3 | Status: SHIPPED | OUTPATIENT
Start: 2024-03-11 | End: 2025-03-11

## 2024-03-11 RX ORDER — DAPAGLIFLOZIN 10 MG/1
10 TABLET, FILM COATED ORAL EVERY MORNING
Qty: 90 TABLET | Refills: 3 | Status: SHIPPED | OUTPATIENT
Start: 2024-03-11 | End: 2025-03-11

## 2024-03-11 RX ORDER — SPIRONOLACTONE 50 MG/1
50 TABLET, FILM COATED ORAL DAILY
Qty: 90 TABLET | Refills: 3 | Status: SHIPPED | OUTPATIENT
Start: 2024-03-11 | End: 2025-03-11

## 2024-03-11 RX ORDER — FUROSEMIDE 20 MG/1
20 TABLET ORAL DAILY
Qty: 90 TABLET | Refills: 3 | Status: SHIPPED | OUTPATIENT
Start: 2024-03-11 | End: 2025-03-11

## 2024-03-11 ASSESSMENT — ENCOUNTER SYMPTOMS
LOSS OF SENSATION IN FEET: 0
DEPRESSION: 0
OCCASIONAL FEELINGS OF UNSTEADINESS: 0

## 2024-03-11 ASSESSMENT — PAIN SCALES - GENERAL: PAINLEVEL: 0-NO PAIN

## 2024-03-11 NOTE — PATIENT INSTRUCTIONS
"It was a pleasure seeing you today. I would like to see you back in clinic in  4  months. You can call my office if questions arise between now and our next visit.     Today, we talked about Your Heart nessa     -- We will refer you to Cardiac rehab.     -- We will perform a stress test before cardiac rehab to determine your starting point.     -- we will repeat an echocardiogram in about 32 months to re-assess your heart function     -- Please continue the following medications   == You need to be on Aspirin Until  March 29th 2024. Then STOP   == You will Continue Clopidogrel (Plavix) 75 mg For at least a year  == You will continue Apixaban ( Eliquis ) 5 mg twice Daily for Life   ++ DO NOT USE XARELTO ( RIVAROXABAN ) as this increases the bleeding risk.       Your other Heart medications have been refilled and are as follows   -- Spironolactone 50 mg Dialy   -- atorvastatin 80 mg Daily   -- Carvedilol 25 mg BID   -- Farxiga 10 mg Daily   -- Hydralazine 100 mg Three Times Daily   -- Isosorbide Mononitrate 120 mg Daily             Below are some Heart Health Tips that we provide to all of our patients. I hope you fing them useful.     - If you are having problems with medications, consider looking at the following websites.   --  \"[employer]\"   --  \"[facility]\"      - We are happy to supply written prescriptions if needed to allow you to obtain your medications from different pharmacies. Additionally, if you are having issues with mail order delivery, please let us know. We can send a limited supply of your medications to your local pharmacy.     -  We recommend you follow a heart healthy diet. Watch food labels and try not to eat more than 2,500 mg of sodium per day. Avoid foods high in salt like processed meats (lunch meats, reynaga, and sausage), processed foods (boxed dinners, canned soups), fried and fast foods. Monitor serving sizes and if the sodium per serving size is more than 200 mg, avoid " those foods. If the sodium per serving size is between 100-200 mg, you can use those in limited quantities. Try to choose foods where the amount of sodium per serving size is less than 100 mg. Try to eat a diet rich in fruits and vegetables, whole grains, low fat dairy products, skinless poultry and fish, nuts, beans, non-tropical vegetable oils. Limit saturated fat, trans fat, sodium, red meats, and sugar-sweetened beverages.   Limit alcohol     -The combination of a reduced-calorie diet and increased physical activity is recommended. Adults should aim to get at least 150 minutes of moderate physical activity per week (30 minutes of moderate physical activities at least 5 days per week). Examples of moderate physical activities include brisk walking, swimming, aerobic dancing, heavy gardening, jumping rope, bicycling 10 MPH or faster, tennis, hiking uphill or with a heavy backpack. Please let us know if you would like to learn more about your nutrition and calories and additional options including weight loss programs to help you reach your goal.     -If you smoke, stop smoking. If you stop smoking you can help get rid of a major source of stress to your heart. Smoking makes your heart rate and blood pressure go up and increases your risk or developing cardiovascular diseases and worsen symptoms associated with heart failure.     -Obtain a BP monitor and monitor your BP daily. Check it around the same time each day; at least 1 hour after taking your medications. Record your BP in a log and bring your log with you to your doctors appointment.     -F/u with your PCP as recommended.

## 2024-03-16 LAB
ATRIAL RATE: 87 BPM
P AXIS: 65 DEGREES
P OFFSET: 185 MS
P ONSET: 129 MS
PR INTERVAL: 172 MS
Q ONSET: 215 MS
QRS COUNT: 14 BEATS
QRS DURATION: 144 MS
QT INTERVAL: 444 MS
QTC CALCULATION(BAZETT): 534 MS
QTC FREDERICIA: 502 MS
R AXIS: -54 DEGREES
T AXIS: 147 DEGREES
T OFFSET: 437 MS
VENTRICULAR RATE: 87 BPM

## 2024-03-18 ENCOUNTER — HOSPITAL ENCOUNTER (OUTPATIENT)
Dept: CARDIOLOGY | Facility: HOSPITAL | Age: 69
Discharge: HOME | End: 2024-03-18
Payer: MEDICARE

## 2024-03-18 DIAGNOSIS — I21.4 NON-ST ELEVATION MYOCARDIAL INFARCTION (NSTEMI), SUBENDOCARDIAL INFARCTION, SUBSEQUENT EPISODE OF CARE (MULTI): ICD-10-CM

## 2024-03-18 PROCEDURE — 93017 CV STRESS TEST TRACING ONLY: CPT

## 2024-03-18 PROCEDURE — 93018 CV STRESS TEST I&R ONLY: CPT | Performed by: INTERNAL MEDICINE

## 2024-03-18 PROCEDURE — 93016 CV STRESS TEST SUPVJ ONLY: CPT | Performed by: INTERNAL MEDICINE

## 2024-03-19 LAB
ATRIAL RATE: 79 BPM
P AXIS: 68 DEGREES
P OFFSET: 191 MS
P ONSET: 134 MS
PR INTERVAL: 164 MS
Q ONSET: 216 MS
QRS COUNT: 13 BEATS
QRS DURATION: 134 MS
QT INTERVAL: 400 MS
QTC CALCULATION(BAZETT): 458 MS
QTC FREDERICIA: 438 MS
R AXIS: 42 DEGREES
T AXIS: 189 DEGREES
T OFFSET: 416 MS
VENTRICULAR RATE: 79 BPM

## 2024-03-26 ENCOUNTER — CLINICAL SUPPORT (OUTPATIENT)
Dept: CARDIAC REHAB | Facility: CLINIC | Age: 69
End: 2024-03-26
Payer: MEDICARE

## 2024-03-26 VITALS
DIASTOLIC BLOOD PRESSURE: 64 MMHG | WEIGHT: 185 LBS | BODY MASS INDEX: 28.04 KG/M2 | RESPIRATION RATE: 12 BRPM | HEIGHT: 68 IN | HEART RATE: 69 BPM | OXYGEN SATURATION: 97 % | SYSTOLIC BLOOD PRESSURE: 124 MMHG

## 2024-03-26 DIAGNOSIS — I21.4 NON-ST ELEVATION MYOCARDIAL INFARCTION (NSTEMI), SUBENDOCARDIAL INFARCTION, SUBSEQUENT EPISODE OF CARE (MULTI): Primary | ICD-10-CM

## 2024-03-26 DIAGNOSIS — I25.83 CORONARY ATHEROSCLEROSIS DUE TO LIPID RICH PLAQUE (CODE): ICD-10-CM

## 2024-03-26 ASSESSMENT — PATIENT HEALTH QUESTIONNAIRE - PHQ9
3. TROUBLE FALLING OR STAYING ASLEEP OR SLEEPING TOO MUCH: MORE THAN HALF THE DAYS
4. FEELING TIRED OR HAVING LITTLE ENERGY: MORE THAN HALF THE DAYS
9. THOUGHTS THAT YOU WOULD BE BETTER OFF DEAD, OR OF HURTING YOURSELF: NOT AT ALL
SUM OF ALL RESPONSES TO PHQ QUESTIONS 1-9: 11
SUM OF ALL RESPONSES TO PHQ9 QUESTIONS 1 & 2: 1
8. MOVING OR SPEAKING SO SLOWLY THAT OTHER PEOPLE COULD HAVE NOTICED. OR THE OPPOSITE, BEING SO FIGETY OR RESTLESS THAT YOU HAVE BEEN MOVING AROUND A LOT MORE THAN USUAL: MORE THAN HALF THE DAYS
SUM OF ALL RESPONSES TO PHQ QUESTIONS 1-9: 11
2. FEELING DOWN, DEPRESSED OR HOPELESS: NOT AT ALL
7. TROUBLE CONCENTRATING ON THINGS, SUCH AS READING THE NEWSPAPER OR WATCHING TELEVISION: MORE THAN HALF THE DAYS
5. POOR APPETITE OR OVEREATING: MORE THAN HALF THE DAYS
6. FEELING BAD ABOUT YOURSELF - OR THAT YOU ARE A FAILURE OR HAVE LET YOURSELF OR YOUR FAMILY DOWN: NOT AT ALL
1. LITTLE INTEREST OR PLEASURE IN DOING THINGS: SEVERAL DAYS

## 2024-03-26 NOTE — PROGRESS NOTES
Cardiac Rehabilitation Initial Treatment Plan    Name: Marco Henderson III  Medical Record Number: [id number]  YOB: 1955  Age: 68 y.o.    Today’s Date: 3/26/2024  Primary Care Physician: Masha Sharp MD  Referring Physician: Junaid Khanna DO  Program Location: [address]     General  Primary Diagnosis:   1. Coronary atherosclerosis due to lipid rich plaque (CODE)  Referral to Cardiac Rehab      2. Non-ST elevation myocardial infarction (NSTEMI), subendocardial infarction, subsequent episode of care (CMS/HCC)  Referral to Cardiac Rehab         Onset/Date of Diagnosis: 2/28/2024    Initial Assessment, not yet started program.    AACVPR Risk Stratification:       Falls Risk: Low  Psychosocial Assessment     No data recorded    Sent PH-Q 9 to MD if score > 20: No; score < 20    Pt reported/currently experiencing stress: No  Patient uses stress management skills: Yes   History of: no history of anxiety or depression  Currently seeing a mental health provider: No  Social Support: Yes, Whom:family  Quality of Life Survey: SF-36   SF-36 Pre Post   Physical Component Score  TBD   Mental Component Score  TBD     Learning Assessment:  Learning assessment/barriers: None  Preferred learning method: Visual  Barriers: None  Comments:    Stages of Change:Preparation    Psychosocial Plan    Goal Status: Initial Assessment; goals not yet started    Psychosocial Goals: Demonstrating proper techniques for stress management, Maintain or lower PH-Q 9 score by discharge, and Identify strategies for managing depression    Psychosocial Interventions/Education: To be done in Cardiac Rehab.    Initial Assessment: in progress    Nutrition Assessment:    Hyperlipidemia: Yes     Lipids:   Lab Results   Component Value Date    CHOL 121 02/29/2024    HDL 59.8 02/29/2024    TRIG 87 02/29/2024       Current Dietary Guidelines: Low fat, Low sodium  Barriers to dietary change: no    Diet Habit Survey: Picture Your  Plate  Pre:  46  Post: To be done at discharge.    Diabetes Assessment    Lab Results   Component Value Date    HGBA1C 4.8 02/29/2024       History of Diabetes:  boarderline    Weight Management       No data recorded    Nutrition Plan    Goal Status: Initial Assessment; goals not yet started    Nutrition Goals: Lipid Goal: HDL>45, LDL <70, Total <180, Trigs <150, Improve Diet Habit Survey score by 5-10 points by discharge, Adapt a low-sodium, DASH diet prior to discharge, and Adapt a Mediterranean focused diet prior to discharge    Nutrition Interventions/Education:   To be done in Cardiac Rehab.    Initial Assessment: in progress    Exercise Assessment    No  Mode: NA  Frequency: NA  Duration: NA    Exercise Prescription     Exercise Prescription based on: Pre-rehab stress test   Frequency:  3 days/week   Mode: Treadmill, NuStep, and Recumbent Cycle   Duration: 24 total aerobic minutes   Intensity: RPE 12-16  Target HR:   85-91  MET Level: 4.8  Patient wears supplemental O2: No     Modality Workload METs Duration (minutes)   1 Pre-Exercise   2:00   2 Treadmill 2.1mph  2.6 8 :00   3 NuStep 3@52W  2.6 8 :00   4 Recumbent Bike 3@55rpm  2.8 8 :00   5 Cooldown    5 :00   6 Post-Exercise   2:00     Resistance Training: No   Home Exercise Prescription given: To be given at session # 6    Exercise Plan    Goal Status: Initial Assessment; goals not yet started    Exercise Goals: Increase exercise MET level by 5-10% each week, Increase total exercise duration to 30-45 minutes, and Obtain 150 minutes/week of moderate intensity aerobic exercise    Exercise Interventions/Education:   To be done in Cardiac Rehab.    Initial Assessment: in progress    Other Core Components/Risk Factor Assessment:    Medication adherence  Current Medications:   Medication Documentation Review Audit       Reviewed by Giuliana Allen RN (Registered Nurse) on 03/11/24 at 1208      Medication Order Taking? Sig Documenting Provider Last Dose Status    allopurinol (Zyloprim) 300 mg tablet [id number] Yes Take 1 tablet (300 mg) by mouth once daily. Mat Jose MD Taking Active   aspirin 81 mg chewable tablet [id number] Yes Chew 1 tablet (81 mg) once daily for 7 days. Ty Smith MD Taking Active   atorvastatin (Lipitor) 80 mg tablet [id number] Yes Take 1 tablet (80 mg) by mouth once daily at bedtime. Ty Smith MD Taking Active     Discontinued 03/11/24 1206   carvedilol (Coreg) 25 mg tablet [id number] Yes Take 1 tablet (25 mg) by mouth 2 times a day with meals. Mat Jose MD Taking Active   clopidogrel (Plavix) 75 mg tablet [id number] Yes Take 1 tablet (75 mg) by mouth once daily. Do not start before March 3, 2024. Ty Smith MD Taking Active   Farxiga 10 mg [id number] Yes Take 1 tablet (10 mg) by mouth once daily in the morning. Mat Jose MD Taking Active   ferrous gluconate 324 (38 Fe) mg tablet [id number] Yes Take 1 tablet (324 mg) by mouth once daily. Mat Jose MD Taking Active   furosemide (Lasix) 20 mg tablet [id number] Yes Take 1 tablet (20 mg) by mouth once daily. Mat Jose MD Taking Active   hydrALAZINE (Apresoline) 100 mg tablet [id number] Yes Take 1 tablet (100 mg) by mouth 3 times a day with meals. Mat Jose MD Taking Active   isosorbide mononitrate ER (Imdur) 120 mg 24 hr tablet [id number] Yes Take 1 tablet (120 mg) by mouth once daily in the morning. Mat Jose MD Taking Active   multivit-mins/iron/folic/lycop (CENTRUM MEN ORAL) [id number] Yes Take 1 tablet by mouth once daily. Mat Jose MD Taking Active   nitroglycerin (Nitrostat) 0.4 mg SL tablet [id number] Yes Place 1 tablet (0.4 mg) under the tongue if needed. Mat Jose MD Taking Active   omega-3 1,000 mg capsule capsule [id number] Yes Take 1 tablet by mouth once daily. Mat Jose MD Taking Active   rivaroxaban (Xarelto) 20 mg tablet [id number] Yes Take 1 tablet (20 mg) by mouth once  daily in the evening. Take with meals. Take with food. Ty Smith MD Taking Active   spironolactone (Aldactone) 50 mg tablet [id number] Yes Take 1 tablet (50 mg) by mouth once daily. Historical Provider, MD Taking Active   tamsulosin (Flomax) 0.4 mg 24 hr capsule [id number] Yes TAKE 1 CAPSULE BY MOUTH EVERY DAY 1/2 HOUR FOLLOWING THE SAME MEAL EACH DAY Historical Provider, MD Taking Active                                 Medication compliance: Yes   Uses pill box/organizer: Yes    Carries medication list: No     Blood Pressure Management  History of Hypertension: Yes   Medication Changes: No   Resting BP:  Visit Vitals  /64 (BP Location: Left arm, Patient Position: Sitting, BP Cuff Size: Large adult)   Pulse 69   Resp 12        Heart Failure Management  Hx of Heart Failure: Yes;   Type (selection): HFrEF  Most recent EF: 40-45%      Onset of heart failure diagnosis: 2/29/2024  Last heart failure hospitalization: 0  Number of HF admissions per year: 0    Symptoms:  none  Is there a family Hx of HF: No   Does patient obtain daily weight No     KCCQ survey: Pre:   Post:     Heart Failure Reassessment: Initial Treatment Plan. Will reassess in 30 days.    Heart Failure Goals: Adapt a low sodium diet and verbalize guidelines    Smoking/Tobacco Assessment  Social History     Tobacco Use   Smoking Status Some Days    Types: Cigars   Smokeless Tobacco Never   Tobacco Comments    1 cigar per week       Other Core Component Plan    Goal Status: Initial Assessment; goals not yet started    Other Core Component Goals: Medication compliance    Other Core Component Interventions/Education:   Verbalize medication usage and drug actions by discharge and Verbalize SL NTG action and proper dosage by discharge    Initial Assessment: in progress    Individual Patient Goals:    Be able to attend [employer] 3-5 days a week by discharge  Establish a regular exercise routine routine 3 min daily by discharge    Goal Status:  Initial Assessment; goals not yet started    Staff Comments:      Rehab Staff Signature: Zully Frias RN

## 2024-03-29 ENCOUNTER — CLINICAL SUPPORT (OUTPATIENT)
Dept: CARDIAC REHAB | Facility: CLINIC | Age: 69
End: 2024-03-29
Payer: MEDICARE

## 2024-03-29 DIAGNOSIS — I21.4 NON-ST ELEVATION MYOCARDIAL INFARCTION (NSTEMI), SUBENDOCARDIAL INFARCTION, SUBSEQUENT EPISODE OF CARE (MULTI): ICD-10-CM

## 2024-03-29 PROCEDURE — 93798 PHYS/QHP OP CAR RHAB W/ECG: CPT | Performed by: INTERNAL MEDICINE

## 2024-04-01 ENCOUNTER — CLINICAL SUPPORT (OUTPATIENT)
Dept: CARDIAC REHAB | Facility: CLINIC | Age: 69
End: 2024-04-01
Payer: MEDICARE

## 2024-04-01 DIAGNOSIS — I21.4 NON-ST ELEVATION MYOCARDIAL INFARCTION (NSTEMI), SUBENDOCARDIAL INFARCTION, SUBSEQUENT EPISODE OF CARE (MULTI): ICD-10-CM

## 2024-04-01 PROCEDURE — 93798 PHYS/QHP OP CAR RHAB W/ECG: CPT | Performed by: INTERNAL MEDICINE

## 2024-04-03 ENCOUNTER — APPOINTMENT (OUTPATIENT)
Dept: CARDIAC REHAB | Facility: CLINIC | Age: 69
End: 2024-04-03
Payer: MEDICARE

## 2024-04-05 ENCOUNTER — CLINICAL SUPPORT (OUTPATIENT)
Dept: CARDIAC REHAB | Facility: CLINIC | Age: 69
End: 2024-04-05
Payer: MEDICARE

## 2024-04-05 DIAGNOSIS — I21.4 NON-ST ELEVATION MYOCARDIAL INFARCTION (NSTEMI), SUBENDOCARDIAL INFARCTION, SUBSEQUENT EPISODE OF CARE (MULTI): ICD-10-CM

## 2024-04-05 PROCEDURE — 93798 PHYS/QHP OP CAR RHAB W/ECG: CPT | Performed by: INTERNAL MEDICINE

## 2024-04-08 ENCOUNTER — CLINICAL SUPPORT (OUTPATIENT)
Dept: CARDIAC REHAB | Facility: CLINIC | Age: 69
End: 2024-04-08
Payer: MEDICARE

## 2024-04-08 DIAGNOSIS — I21.4 NON-ST ELEVATION MYOCARDIAL INFARCTION (NSTEMI), SUBENDOCARDIAL INFARCTION, SUBSEQUENT EPISODE OF CARE (MULTI): ICD-10-CM

## 2024-04-08 PROCEDURE — 93798 PHYS/QHP OP CAR RHAB W/ECG: CPT | Performed by: INTERNAL MEDICINE

## 2024-04-10 ENCOUNTER — CLINICAL SUPPORT (OUTPATIENT)
Dept: CARDIAC REHAB | Facility: CLINIC | Age: 69
End: 2024-04-10
Payer: MEDICARE

## 2024-04-10 DIAGNOSIS — I21.4 NON-ST ELEVATION MYOCARDIAL INFARCTION (NSTEMI), SUBENDOCARDIAL INFARCTION, SUBSEQUENT EPISODE OF CARE (MULTI): ICD-10-CM

## 2024-04-10 PROCEDURE — 93798 PHYS/QHP OP CAR RHAB W/ECG: CPT | Performed by: INTERNAL MEDICINE

## 2024-04-12 ENCOUNTER — CLINICAL SUPPORT (OUTPATIENT)
Dept: CARDIAC REHAB | Facility: CLINIC | Age: 69
End: 2024-04-12
Payer: MEDICARE

## 2024-04-12 DIAGNOSIS — I21.4 NON-ST ELEVATION MYOCARDIAL INFARCTION (NSTEMI), SUBENDOCARDIAL INFARCTION, SUBSEQUENT EPISODE OF CARE (MULTI): ICD-10-CM

## 2024-04-12 PROCEDURE — 93798 PHYS/QHP OP CAR RHAB W/ECG: CPT | Performed by: INTERNAL MEDICINE

## 2024-04-15 ENCOUNTER — CLINICAL SUPPORT (OUTPATIENT)
Dept: CARDIAC REHAB | Facility: CLINIC | Age: 69
End: 2024-04-15
Payer: MEDICARE

## 2024-04-15 DIAGNOSIS — I21.4 NON-ST ELEVATION MYOCARDIAL INFARCTION (NSTEMI), SUBENDOCARDIAL INFARCTION, SUBSEQUENT EPISODE OF CARE (MULTI): ICD-10-CM

## 2024-04-15 PROCEDURE — 93798 PHYS/QHP OP CAR RHAB W/ECG: CPT | Performed by: INTERNAL MEDICINE

## 2024-04-17 ENCOUNTER — CLINICAL SUPPORT (OUTPATIENT)
Dept: CARDIAC REHAB | Facility: CLINIC | Age: 69
End: 2024-04-17
Payer: MEDICARE

## 2024-04-17 DIAGNOSIS — I21.4 NON-ST ELEVATION MYOCARDIAL INFARCTION (NSTEMI), SUBENDOCARDIAL INFARCTION, SUBSEQUENT EPISODE OF CARE (MULTI): ICD-10-CM

## 2024-04-17 PROCEDURE — 93798 PHYS/QHP OP CAR RHAB W/ECG: CPT

## 2024-04-19 ENCOUNTER — CLINICAL SUPPORT (OUTPATIENT)
Dept: CARDIAC REHAB | Facility: CLINIC | Age: 69
End: 2024-04-19
Payer: MEDICARE

## 2024-04-19 DIAGNOSIS — I21.4 NON-ST ELEVATION MYOCARDIAL INFARCTION (NSTEMI), SUBENDOCARDIAL INFARCTION, SUBSEQUENT EPISODE OF CARE (MULTI): ICD-10-CM

## 2024-04-19 PROCEDURE — 93798 PHYS/QHP OP CAR RHAB W/ECG: CPT | Performed by: INTERNAL MEDICINE

## 2024-04-22 ENCOUNTER — DOCUMENTATION (OUTPATIENT)
Dept: CARDIAC REHAB | Facility: CLINIC | Age: 69
End: 2024-04-22

## 2024-04-22 ENCOUNTER — CLINICAL SUPPORT (OUTPATIENT)
Dept: CARDIAC REHAB | Facility: CLINIC | Age: 69
End: 2024-04-22
Payer: MEDICARE

## 2024-04-22 DIAGNOSIS — I21.4 NON-ST ELEVATION MYOCARDIAL INFARCTION (NSTEMI), SUBENDOCARDIAL INFARCTION, SUBSEQUENT EPISODE OF CARE (MULTI): ICD-10-CM

## 2024-04-22 PROCEDURE — 93798 PHYS/QHP OP CAR RHAB W/ECG: CPT | Performed by: INTERNAL MEDICINE

## 2024-04-22 NOTE — PROGRESS NOTES
Cardiac Rehabilitation 30 Day Reassessment    Name: Marco Henderson III  Medical Record Number: [id number]  YOB: 1955  Age: 68 y.o.    Today’s Date: 4/22/2024  Primary Care Physician: Masha Sharp MD  Referring Physician: Junaid Khanna DO  Program Location: [address]  Primary Diagnosis:   NSTEMI     Onset/Date of Diagnosis: 2/28/2024    Session # 10.    AACVPR Risk Stratification: High      Falls Risk: Patient has remained free from falls while enrolled in rehab as of 4/22/24.     Psychosocial Assessment     Pre PHQ-9: 11    Sent PH-Q 9 to MD if score > 20: No; score < 20    Pt reported/currently experiencing stress: No  Patient uses stress management skills: Yes   History of: no history of anxiety or depression  Currently seeing a mental health provider: No  Social Support: Yes, Whom:family  Quality of Life Survey: SF-36   SF-36 Pre Post   Physical Component Score  TBD   Mental Component Score  TBD     Learning Assessment:  Learning assessment/barriers: None  Preferred learning method: Visual  Barriers: None  Comments:    Stages of Change:Action    Psychosocial Plan    Goal Status: In progress    Psychosocial Goals: Demonstrating proper techniques for stress management, Maintain or lower PH-Q 9 score by discharge, and Identify strategies for managing depression    Psychosocial Interventions/Education:   3/26/24 Initial PHQ-9 score reviewed.  4/24/24 Pt reports no new stress/CV        Nutrition Assessment:    Hyperlipidemia: Yes     Lipids:   Lab Results   Component Value Date    CHOL 121 02/29/2024    HDL 59.8 02/29/2024    TRIG 87 02/29/2024       Current Dietary Guidelines: Low fat, Low sodium  Barriers to dietary change: no    Diet Habit Survey: Picture Your Plate  Pre:  46  Post: To be done at discharge.    Diabetes Assessment    Lab Results   Component Value Date    HGBA1C 4.8 02/29/2024       History of Diabetes:  boarderline    Weight Management  Height: 67  inches  Weight: 185lbs  BMI: 28.14       Nutrition Plan    Goal Status: In progress    Nutrition Goals: Lipid Goal: HDL>45, LDL <70, Total <180, Trigs <150, Improve Diet Habit Survey score by 5-10 points by discharge, Adapt a low-sodium, DASH diet prior to discharge, and Adapt a Mediterranean focused diet prior to discharge    Nutrition Interventions/Education:   4/10/24 reviewed PYP feedback, handout given for review.      Nutrition Reassessment: in progress.    Exercise Assessment    No  Mode: NA  Frequency: NA  Duration: NA    Exercise Prescription     Exercise Prescription based on: Pre-rehab stress test   Frequency:  3 days/week   Mode: Treadmill, NuStep, and Recumbent Cycle   Duration: 24 total aerobic minutes   Intensity: RPE 12-16  Target HR:   85-91  MET Level: 4.5  Patient wears supplemental O2: No     Modality Workload METs Duration (minutes)   1 Pre-Exercise   2:00   2 Treadmill 2.3 mph @ 1.5% 3.3 11 :00   3 NuStep Load 6 @ 84 [address] 3.5 11 :00   4 Recumbent Bike Load 5 @ 65 rpm  4.5 11 :00   5 Cooldown    5 :00   6 Post-Exercise   2:00     Resistance Training: No   Home Exercise Prescription given: To be given.    Exercise Plan    Goal Status: In progress    Exercise Goals: Increase exercise MET level by 5-10% each week, Increase total exercise duration to 30-45 minutes, and Obtain 150 minutes/week of moderate intensity aerobic exercise    Exercise Interventions/Education:   3/29/24 Reviewed ex rx/cv  4/2/424 making progress/cv    Initial Assessment: in progress    Other Core Components/Risk Factor Assessment:    Medication adherence  Current Medications:   Medication Documentation Review Audit       Reviewed by Zully Frias RN (Registered Nurse) on 03/26/24 at 0912      Medication Order Taking? Sig Documenting Provider Last Dose Status   allopurinol (Zyloprim) 300 mg tablet [id number] Yes Take 1 tablet (300 mg) by mouth once daily. Historical Provider, MD Taking Active   apixaban (Eliquis) 5 mg tablet  [id number] Yes Take 1 tablet (5 mg) by mouth 2 times a day. Junaid Khanna DO Taking Active   atorvastatin (Lipitor) 80 mg tablet [id number] Yes Take 1 tablet (80 mg) by mouth once daily at bedtime. Junaid Khanna DO Taking Active   carvedilol (Coreg) 25 mg tablet [id number] Yes Take 1 tablet (25 mg) by mouth 2 times a day with meals. Junaid Khanna DO Taking Active   clopidogrel (Plavix) 75 mg tablet [id number] Yes Take 1 tablet (75 mg) by mouth once daily. Junaid Khanna DO Taking Active   Farxiga 10 mg [id number] Yes Take 1 tablet (10 mg) by mouth once daily in the morning. Junaid Khanna DO Taking Active   ferrous gluconate 324 (38 Fe) mg tablet [id number] Yes Take 1 tablet (324 mg) by mouth once daily. Historical Provider, MD Taking Active   furosemide (Lasix) 20 mg tablet [id number] Yes Take 1 tablet (20 mg) by mouth once daily. Junaid Khanna DO Taking Active   hydrALAZINE (Apresoline) 100 mg tablet [id number] Yes Take 1 tablet (100 mg) by mouth 3 times a day with meals. Junaid Khanna DO Taking Active   isosorbide mononitrate ER (Imdur) 120 mg 24 hr tablet [id number] Yes Take 1 tablet (120 mg) by mouth once daily in the morning. Junaid Khanna DO Taking Active   multivit-mins/iron/folic/lycop (CENTRUM MEN ORAL) [id number] Yes Take 1 tablet by mouth once daily. Historical Provider, MD Taking Active   nitroglycerin (Nitrostat) 0.4 mg SL tablet [id number] Yes Place 1 tablet (0.4 mg) under the tongue if needed for chest pain. Junaid Khanna DO Taking Active   omega-3 1,000 mg capsule capsule [id number] Yes Take 1 tablet by mouth once daily. Historical Provider, MD Taking Active   spironolactone (Aldactone) 50 mg tablet [id number] Yes Take 1 tablet (50 mg) by mouth once daily. Junaid Khanna DO Taking Active   tamsulosin (Flomax) 0.4 mg 24 hr capsule [id number] Yes TAKE 1 CAPSULE BY MOUTH EVERY DAY 1/2 HOUR FOLLOWING THE SAME MEAL EACH DAY Historical Provider, MD Taking Active                                  Medication compliance: Yes   Uses pill box/organizer: Yes    Carries medication list: No     Blood Pressure Management  History of Hypertension: Yes   Medication Changes: No   Resting BP:  140/78       Heart Failure Management  Hx of Heart Failure: Yes;   Type (selection): HFrEF  Most recent EF: 40-45%      Onset of heart failure diagnosis: 2/29/2024  Last heart failure hospitalization: 0  Number of HF admissions per year: 0    Symptoms:  none  Is there a family Hx of HF: No   Does patient obtain daily weight No       Heart Failure Reassessment: Reassessed every 30 days while in program.   Unplanned MD and/or ED Heart Failure visit: No  Hospitalization since starting program/last assessment: No  MD contacted regarding Heart Failure symptoms: No    Heart Failure Goals: Adapt a low sodium diet and verbalize guidelines    Smoking/Tobacco Assessment  Social History     Tobacco Use   Smoking Status Some Days    Types: Cigars   Smokeless Tobacco Never   Tobacco Comments    1 cigar per week       Other Core Component Plan    Goal Status: In progress    Other Core Component Goals: Medication compliance, Verbalize medication usage and drug actions by discharge and Verbalize SL NTG action and proper dosage by discharge    Other Core Component Interventions/Education:   3/29/24 BP readings taken pre and post exercise at each session to ensure proper control/CV  4/19/24 Reviewed effects of exercise on BP/cv    Initial Assessment: in progress    Individual Patient Goals:    Be able to attend [employer] 3-5 days a week by discharge  Establish a regular exercise routine 30 min daily by discharge    Goal Status: In progress    Staff Comments:  Mr. Henderson has started his cardiac rehab and has attended 10 sessions.  He is making progress towards his goals and outcomes.  He voiced no cardiac complaint.  No ectopy was noted on the monitor during exercise sessions.    Rehab Staff Signature: María Stockton RN

## 2024-04-24 ENCOUNTER — CLINICAL SUPPORT (OUTPATIENT)
Dept: CARDIAC REHAB | Facility: CLINIC | Age: 69
End: 2024-04-24
Payer: MEDICARE

## 2024-04-24 DIAGNOSIS — I21.4 NON-ST ELEVATION MYOCARDIAL INFARCTION (NSTEMI), SUBENDOCARDIAL INFARCTION, SUBSEQUENT EPISODE OF CARE (MULTI): ICD-10-CM

## 2024-04-24 PROCEDURE — 93798 PHYS/QHP OP CAR RHAB W/ECG: CPT

## 2024-04-26 ENCOUNTER — CLINICAL SUPPORT (OUTPATIENT)
Dept: CARDIAC REHAB | Facility: CLINIC | Age: 69
End: 2024-04-26
Payer: MEDICARE

## 2024-04-26 DIAGNOSIS — I21.4 NON-ST ELEVATION MYOCARDIAL INFARCTION (NSTEMI), SUBENDOCARDIAL INFARCTION, SUBSEQUENT EPISODE OF CARE (MULTI): ICD-10-CM

## 2024-04-26 PROCEDURE — 93798 PHYS/QHP OP CAR RHAB W/ECG: CPT | Performed by: INTERNAL MEDICINE

## 2024-04-29 ENCOUNTER — CLINICAL SUPPORT (OUTPATIENT)
Dept: CARDIAC REHAB | Facility: CLINIC | Age: 69
End: 2024-04-29
Payer: MEDICARE

## 2024-04-29 DIAGNOSIS — I21.4 NON-ST ELEVATION MYOCARDIAL INFARCTION (NSTEMI), SUBENDOCARDIAL INFARCTION, SUBSEQUENT EPISODE OF CARE (MULTI): ICD-10-CM

## 2024-04-29 PROCEDURE — 93798 PHYS/QHP OP CAR RHAB W/ECG: CPT | Performed by: INTERNAL MEDICINE

## 2024-05-01 ENCOUNTER — CLINICAL SUPPORT (OUTPATIENT)
Dept: CARDIAC REHAB | Facility: CLINIC | Age: 69
End: 2024-05-01
Payer: MEDICARE

## 2024-05-01 DIAGNOSIS — I21.4 NON-ST ELEVATION MYOCARDIAL INFARCTION (NSTEMI), SUBENDOCARDIAL INFARCTION, SUBSEQUENT EPISODE OF CARE (MULTI): ICD-10-CM

## 2024-05-01 PROCEDURE — 93798 PHYS/QHP OP CAR RHAB W/ECG: CPT | Performed by: INTERNAL MEDICINE

## 2024-05-03 ENCOUNTER — CLINICAL SUPPORT (OUTPATIENT)
Dept: CARDIAC REHAB | Facility: CLINIC | Age: 69
End: 2024-05-03
Payer: MEDICARE

## 2024-05-03 DIAGNOSIS — I21.4 NON-ST ELEVATION MYOCARDIAL INFARCTION (NSTEMI), SUBENDOCARDIAL INFARCTION, SUBSEQUENT EPISODE OF CARE (MULTI): ICD-10-CM

## 2024-05-03 PROCEDURE — 93798 PHYS/QHP OP CAR RHAB W/ECG: CPT | Performed by: INTERNAL MEDICINE

## 2024-05-06 ENCOUNTER — APPOINTMENT (OUTPATIENT)
Dept: CARDIAC REHAB | Facility: CLINIC | Age: 69
End: 2024-05-06
Payer: MEDICARE

## 2024-05-08 ENCOUNTER — CLINICAL SUPPORT (OUTPATIENT)
Dept: CARDIAC REHAB | Facility: CLINIC | Age: 69
End: 2024-05-08
Payer: MEDICARE

## 2024-05-08 DIAGNOSIS — I21.4 NON-ST ELEVATION MYOCARDIAL INFARCTION (NSTEMI), SUBENDOCARDIAL INFARCTION, SUBSEQUENT EPISODE OF CARE (MULTI): ICD-10-CM

## 2024-05-10 ENCOUNTER — APPOINTMENT (OUTPATIENT)
Dept: CARDIAC REHAB | Facility: CLINIC | Age: 69
End: 2024-05-10
Payer: MEDICARE

## 2024-05-10 ENCOUNTER — DOCUMENTATION (OUTPATIENT)
Dept: CARDIAC REHAB | Facility: CLINIC | Age: 69
End: 2024-05-10
Payer: MEDICARE

## 2024-05-10 NOTE — PROGRESS NOTES
Cardiac Rehabilitation Discharge Summary    Name: Marco Henderson III  Medical Record Number: [id number]  YOB: 1955  Age: 68 y.o.    Today’s Date: 5/10/2024  Primary Care Physician: Masha Sharp MD  Referring Physician: Junaid Khanna DO  Program Location: [address]     General  Primary Diagnosis:   NSTEMI     Onset/Date of Diagnosis: 2/28/2024    Session # 15.    AACVPR Risk Stratification: High      Falls Risk: Patient has remained free from falls while enrolled in rehab as of 4/22/24.     Psychosocial Assessment     Pre PHQ-9: 11    Sent PH-Q 9 to MD if score > 20: No; score < 20    Pt reported/currently experiencing stress: No  Patient uses stress management skills: Yes   History of: no history of anxiety or depression  Currently seeing a mental health provider: No  Social Support: Yes, Whom:family  Quality of Life Survey: SF-36   SF-36 Pre  Post   Physical Component Score   TBD   Mental Component Score   TBD     Learning Assessment:  Learning assessment/barriers: None  Preferred learning method: Visual  Barriers: None  Comments:    Stages of Change:Maintenance    Psychosocial Plan    Goal Status: In progress    Psychosocial Goals: Demonstrating proper techniques for stress management, Maintain or lower PH-Q 9 score by discharge, and Identify strategies for managing depression    Psychosocial Interventions/Education:   3/26/24 Initial PHQ-9 score reviewed.  4/24/24 Pt reports no new stress/CV  5/14/24 Unable to assess.  Pt dropped out of the program. CV        Nutrition Assessment:    Hyperlipidemia: Yes     Lipids:   Lab Results   Component Value Date    CHOL 121 02/29/2024    HDL 59.8 02/29/2024    TRIG 87 02/29/2024       Current Dietary Guidelines: Low fat, Low sodium  Barriers to dietary change: no    Diet Habit Survey: Picture Your Plate  Pre:  46  Post: unable to assess. Pt dropped out of the program.     Diabetes Assessment    Lab Results   Component Value Date    HGBA1C 4.8  02/29/2024       History of Diabetes:  boarderline    Weight Management  Height: 67 inches  Weight: 185lbs  BMI: 28.14       Nutrition Plan    Goal Status: In progress    Nutrition Goals: Lipid Goal: HDL>45, LDL <70, Total <180, Trigs <150, Improve Diet Habit Survey score by 5-10 points by discharge, Adapt a low-sodium, DASH diet prior to discharge, and Adapt a Mediterranean focused diet prior to discharge    Nutrition Interventions/Education:   4/10/24 reviewed PYP feedback, handout given for review.  4/15/24 Cardiac booklet given and lipids reviewed. CV    Initial Assessment: in progress  5/3/24 Pt states that he eats lots of fruits, and vegetables and fish.  He is cutting back on red meet. CV  Nutrition Discharge   5/14/2023 unable to assess.  Pt dropped out of the program. CV    Exercise Assessment    No  Mode: NA  Frequency: NA  Duration: NA    Exercise Prescription     Exercise Prescription based on: Pre-rehab stress test   Frequency:  3 days/week   Mode: Treadmill, NuStep, and Recumbent Cycle   Duration: 36 total aerobic minutes   Intensity: RPE 12-16  Target HR:   85-91  MET Level: 4.5  Patient wears supplemental O2: No     Modality Workload METs Duration (minutes)   1 Pre-Exercise   2:00   2 Treadmill 3 mph @ 2.5% 4.4 12 :00   3 NuStep Load 6 @ 84 Hernadez 3.5 12 :00   4 Recumbent Bike Load 5 @ 65 rpm  4.5 12 :00   5 Cooldown    5 :00   6 Post-Exercise   2:00     Resistance Training: No   Home Exercise Prescription given: yes    Exercise Plan    Goal Status: In progress    Exercise Goals: Increase exercise MET level by 5-10% each week, Increase total exercise duration to 30-45 minutes, and Obtain 150 minutes/week of moderate intensity aerobic exercise    Exercise Interventions/Education:   3/29/24 Reviewed ex rx/cv  4/2/424 making progress/cv  5/14/24 unable to assess.  Pt dropped out of the program. CV    Initial Assessment: in progress  Exercise Discharge unable to assess.  Pt dropped out of the program.  CV    Other Core Components/Risk Factor Assessment:    Medication adherence  Current Medications:   Medication Documentation Review Audit       Reviewed by Zully Frias RN (Registered Nurse) on 03/26/24 at 0912      Medication Order Taking? Sig Documenting Provider Last Dose Status   allopurinol (Zyloprim) 300 mg tablet [id number] Yes Take 1 tablet (300 mg) by mouth once daily. Historical Provider, MD Taking Active   apixaban (Eliquis) 5 mg tablet [id number] Yes Take 1 tablet (5 mg) by mouth 2 times a day. Junaid Khanna DO Taking Active   atorvastatin (Lipitor) 80 mg tablet [id number] Yes Take 1 tablet (80 mg) by mouth once daily at bedtime. Junaid Khanna DO Taking Active   carvedilol (Coreg) 25 mg tablet [id number] Yes Take 1 tablet (25 mg) by mouth 2 times a day with meals. Junaid Khanna DO Taking Active   clopidogrel (Plavix) 75 mg tablet [id number] Yes Take 1 tablet (75 mg) by mouth once daily. Junaid Khanna DO Taking Active   Farxiga 10 mg [id number] Yes Take 1 tablet (10 mg) by mouth once daily in the morning. Junaid Khanna DO Taking Active   ferrous gluconate 324 (38 Fe) mg tablet [id number] Yes Take 1 tablet (324 mg) by mouth once daily. Historical Provider, MD Taking Active   furosemide (Lasix) 20 mg tablet [id number] Yes Take 1 tablet (20 mg) by mouth once daily. Junaid Khanna DO Taking Active   hydrALAZINE (Apresoline) 100 mg tablet [id number] Yes Take 1 tablet (100 mg) by mouth 3 times a day with meals. Junaid Khanna DO Taking Active   isosorbide mononitrate ER (Imdur) 120 mg 24 hr tablet [id number] Yes Take 1 tablet (120 mg) by mouth once daily in the morning. Junaid Khanna DO Taking Active   multivit-mins/iron/folic/lycop (CENTRUM MEN ORAL) [id number] Yes Take 1 tablet by mouth once daily. Historical Provider, MD Taking Active   nitroglycerin (Nitrostat) 0.4 mg SL tablet [id number] Yes Place 1 tablet (0.4 mg) under the tongue if needed for chest pain. Junaid Khanna DO Taking  Active   omega-3 1,000 mg capsule capsule [id number] Yes Take 1 tablet by mouth once daily. Historical Provider, MD Taking Active   spironolactone (Aldactone) 50 mg tablet [id number] Yes Take 1 tablet (50 mg) by mouth once daily. Junaid Khanna DO Taking Active   tamsulosin (Flomax) 0.4 mg 24 hr capsule [id number] Yes TAKE 1 CAPSULE BY MOUTH EVERY DAY 1/2 HOUR FOLLOWING THE SAME MEAL EACH DAY Historical Provider, MD Taking Active                                 Medication compliance: Yes   Uses pill box/organizer: Yes    Carries medication list: No     Blood Pressure Management  History of Hypertension: Yes   Medication Changes: No   Resting BP:  140/78       Heart Failure Management  Hx of Heart Failure: Yes;   Type (selection): HFrEF  Most recent EF: 40-45%      Onset of heart failure diagnosis: 2/29/2024  Last heart failure hospitalization: 0  Number of HF admissions per year: 0    Symptoms:  none  Is there a family Hx of HF: No   Does patient obtain daily weight No       Heart Failure Reassessment: Reassessed every 30 days while in program.   Unplanned MD and/or ED Heart Failure visit: No  Hospitalization since starting program/last assessment: No  MD contacted regarding Heart Failure symptoms: No    Heart Failure Goals: Adapt a low sodium diet and verbalize guidelines    Smoking/Tobacco Assessment  Social History     Tobacco Use   Smoking Status Some Days    Types: Cigars   Smokeless Tobacco Never   Tobacco Comments    1 cigar per week       Other Core Component Plan    Goal Status: In progress    Other Core Component Goals: Medication compliance, Verbalize medication usage and drug actions by discharge and Verbalize SL NTG action and proper dosage by discharge    Other Core Component Interventions/Education:   3/29/24 BP readings taken pre and post exercise at each session to ensure proper control/CV  4/19/24 Reviewed effects of exercise on BP/cv    Initial Assessment: in progress and Other Core Components  Discharge unable to assess.  Pt dropped out of the program.    Individual Patient Goals:    Be able to attend [employer] 3-5 days a week by discharge  Establish a regular exercise routine 30 min daily by discharge    Goal Status: In progress    Staff Comments:  Mr. Henderson has completed 15 sessions of cardiac rehab.  He has dropped out of the program because he went back to work.  He voiced no cardiac complaints and no ectopy was noted on the monitor while enrolled in the program.  His goals and outcomes remain in progress.  Thank you for your referral.    Rehab Staff Signature: María Stockton RN

## 2024-05-13 ENCOUNTER — APPOINTMENT (OUTPATIENT)
Dept: CARDIAC REHAB | Facility: CLINIC | Age: 69
End: 2024-05-13
Payer: MEDICARE

## 2024-05-15 ENCOUNTER — APPOINTMENT (OUTPATIENT)
Dept: CARDIAC REHAB | Facility: CLINIC | Age: 69
End: 2024-05-15
Payer: MEDICARE

## 2024-05-17 ENCOUNTER — APPOINTMENT (OUTPATIENT)
Dept: CARDIAC REHAB | Facility: CLINIC | Age: 69
End: 2024-05-17
Payer: MEDICARE

## 2024-05-20 ENCOUNTER — APPOINTMENT (OUTPATIENT)
Dept: CARDIAC REHAB | Facility: CLINIC | Age: 69
End: 2024-05-20
Payer: MEDICARE

## 2024-05-22 ENCOUNTER — APPOINTMENT (OUTPATIENT)
Dept: CARDIAC REHAB | Facility: CLINIC | Age: 69
End: 2024-05-22
Payer: MEDICARE

## 2024-05-24 ENCOUNTER — APPOINTMENT (OUTPATIENT)
Dept: CARDIAC REHAB | Facility: CLINIC | Age: 69
End: 2024-05-24
Payer: MEDICARE

## 2024-05-29 ENCOUNTER — APPOINTMENT (OUTPATIENT)
Dept: CARDIAC REHAB | Facility: CLINIC | Age: 69
End: 2024-05-29
Payer: MEDICARE

## 2024-05-31 ENCOUNTER — APPOINTMENT (OUTPATIENT)
Dept: CARDIAC REHAB | Facility: CLINIC | Age: 69
End: 2024-05-31
Payer: MEDICARE

## 2024-06-03 ENCOUNTER — APPOINTMENT (OUTPATIENT)
Dept: CARDIAC REHAB | Facility: CLINIC | Age: 69
End: 2024-06-03
Payer: MEDICARE

## 2024-06-05 ENCOUNTER — APPOINTMENT (OUTPATIENT)
Dept: CARDIAC REHAB | Facility: CLINIC | Age: 69
End: 2024-06-05
Payer: MEDICARE

## 2024-06-07 ENCOUNTER — APPOINTMENT (OUTPATIENT)
Dept: CARDIAC REHAB | Facility: CLINIC | Age: 69
End: 2024-06-07
Payer: MEDICARE

## 2024-06-10 ENCOUNTER — APPOINTMENT (OUTPATIENT)
Dept: CARDIAC REHAB | Facility: CLINIC | Age: 69
End: 2024-06-10
Payer: MEDICARE

## 2024-06-12 ENCOUNTER — APPOINTMENT (OUTPATIENT)
Dept: CARDIAC REHAB | Facility: CLINIC | Age: 69
End: 2024-06-12
Payer: MEDICARE

## 2024-06-14 ENCOUNTER — APPOINTMENT (OUTPATIENT)
Dept: CARDIAC REHAB | Facility: CLINIC | Age: 69
End: 2024-06-14
Payer: MEDICARE

## 2024-06-17 ENCOUNTER — APPOINTMENT (OUTPATIENT)
Dept: CARDIAC REHAB | Facility: CLINIC | Age: 69
End: 2024-06-17
Payer: MEDICARE

## 2024-06-19 ENCOUNTER — APPOINTMENT (OUTPATIENT)
Dept: CARDIAC REHAB | Facility: CLINIC | Age: 69
End: 2024-06-19
Payer: MEDICARE

## 2024-09-04 ENCOUNTER — APPOINTMENT (OUTPATIENT)
Dept: CARDIOLOGY | Facility: HOSPITAL | Age: 69
End: 2024-09-04
Payer: COMMERCIAL

## 2024-10-02 ENCOUNTER — OFFICE VISIT (OUTPATIENT)
Dept: CARDIOLOGY | Facility: HOSPITAL | Age: 69
End: 2024-10-02
Payer: COMMERCIAL

## 2024-10-02 VITALS
HEIGHT: 68 IN | OXYGEN SATURATION: 97 % | SYSTOLIC BLOOD PRESSURE: 118 MMHG | DIASTOLIC BLOOD PRESSURE: 74 MMHG | BODY MASS INDEX: 28.64 KG/M2 | HEART RATE: 72 BPM | WEIGHT: 189 LBS

## 2024-10-02 DIAGNOSIS — I50.42 CHRONIC COMBINED SYSTOLIC AND DIASTOLIC CHF (CONGESTIVE HEART FAILURE): ICD-10-CM

## 2024-10-02 DIAGNOSIS — I10 BENIGN HYPERTENSION: ICD-10-CM

## 2024-10-02 DIAGNOSIS — I48.91 ATRIAL FIBRILLATION/FLUTTER (MULTI): ICD-10-CM

## 2024-10-02 DIAGNOSIS — Z95.5 STATUS POST INSERTION OF DRUG-ELUTING STENT INTO LEFT ANTERIOR DESCENDING (LAD) ARTERY: Primary | Chronic | ICD-10-CM

## 2024-10-02 DIAGNOSIS — I48.92 ATRIAL FIBRILLATION/FLUTTER (MULTI): ICD-10-CM

## 2024-10-02 PROCEDURE — 93010 ELECTROCARDIOGRAM REPORT: CPT | Performed by: INTERNAL MEDICINE

## 2024-10-02 PROCEDURE — 3074F SYST BP LT 130 MM HG: CPT | Performed by: INTERNAL MEDICINE

## 2024-10-02 PROCEDURE — 3008F BODY MASS INDEX DOCD: CPT | Performed by: INTERNAL MEDICINE

## 2024-10-02 PROCEDURE — 1159F MED LIST DOCD IN RCRD: CPT | Performed by: INTERNAL MEDICINE

## 2024-10-02 PROCEDURE — 99215 OFFICE O/P EST HI 40 MIN: CPT | Performed by: INTERNAL MEDICINE

## 2024-10-02 PROCEDURE — 3044F HG A1C LEVEL LT 7.0%: CPT | Performed by: INTERNAL MEDICINE

## 2024-10-02 PROCEDURE — 93005 ELECTROCARDIOGRAM TRACING: CPT | Performed by: INTERNAL MEDICINE

## 2024-10-02 PROCEDURE — 3048F LDL-C <100 MG/DL: CPT | Performed by: INTERNAL MEDICINE

## 2024-10-02 PROCEDURE — G2211 COMPLEX E/M VISIT ADD ON: HCPCS | Performed by: INTERNAL MEDICINE

## 2024-10-02 PROCEDURE — 3078F DIAST BP <80 MM HG: CPT | Performed by: INTERNAL MEDICINE

## 2024-10-02 RX ORDER — CLOPIDOGREL BISULFATE 75 MG/1
75 TABLET ORAL DAILY
Qty: 90 TABLET | Refills: 2 | Status: SHIPPED | OUTPATIENT
Start: 2024-10-02

## 2024-10-02 NOTE — PROGRESS NOTES
"Chief Complaint:   No chief complaint on file.     History Of Present Illness:    Marco Henderson III is a 69 y.o. male here to establish care. 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      He reports doing well and offers no cardiovascular complaints. Has been exercising regularly. Has been compliant with his medications.      Last Recorded Vitals:  Vitals:    10/02/24 1307   BP: 118/74   BP Location: Left arm   Patient Position: Sitting   BP Cuff Size: Adult   Pulse: 72   SpO2: 97%   Weight: 85.7 kg (189 lb)   Height: 1.727 m (5' 8\")             Allergies:  Lisinopril and Valsartan    Outpatient Medications:  Current Outpatient Medications   Medication Instructions    allopurinol (Zyloprim) 300 mg tablet 1 tablet, oral, Daily    apixaban (ELIQUIS) 5 mg, oral, 2 times daily    atorvastatin (LIPITOR) 80 mg, oral, Nightly    carvedilol (COREG) 25 mg, oral, 2 times daily (morning and late afternoon)    Farxiga 10 mg, oral, Every morning    ferrous gluconate (FERGON) 324 mg, oral, Daily RT    furosemide (LASIX) 20 mg, oral, Daily    hydrALAZINE (APRESOLINE) 100 mg, oral, 3 times daily (morning, midday, late afternoon)    isosorbide mononitrate ER (IMDUR) 120 mg, oral, Every morning    multivit-mins/iron/folic/lycop (CENTRUM MEN ORAL) 1 tablet, oral, Daily    nitroglycerin (NITROSTAT) 0.4 mg, sublingual, As needed    omega-3 1,000 mg capsule capsule 1 tablet, oral, Daily RT    spironolactone (ALDACTONE) 50 mg, oral, Daily    tamsulosin (Flomax) 0.4 mg 24 hr capsule TAKE 1 CAPSULE BY MOUTH EVERY DAY 1/2 HOUR FOLLOWING THE SAME MEAL EACH DAY         Physical Exam:  Gen Well nourished septuagenarian male sitting up in NAD. Body mass index is 28.74 kg/m².  Eyes sclera nonicteric. Pupils normal size and " symmetric.  Neck supple with no masses or thyromegaly.  CV rrr. No m/r/g appreciated. No JVD or leg edema. No carotid bruits appreciated.  Pulm Lungs clear with normal respiratory effort.  Skin No bruises rashes or jaundice. No induration or nodules.  Psych Appropriate affect and mood. Normal judgement and insight.  Neuro Alert and conversant. Grossly nonfocal.       I reviewed the patient's ECG - NSR. LVH with QRS widening and repolarization abnormality.     I reviewed most recent imaging / labs / and office notes as well as details of any recent hospitalizations or ED visits.    Assessment/Plan   1.  CAD s/p multivessel PCI  RCA PCI x 2 '05 and '07, LAD PCI 3/2024. Stable by available metrics. Indefinite statin. On clopidogrel through 2/2025 with plan to switch to aspirin with continued Eliquis use. Last LDL acceptable. Continued exercise encouraged.     2. HFmrEF  Chronic & stable. Euvolemic on a reasonable regimen. Intolerant to ace-I/arb.    3. Hypertension   BP well controlled. His present regimen is to continue.     4. History of atrial flutter / fibrillation  S/p flutter ablation. History of atrial fibrillation as well being paroxysmal. On Eliquis for anticoagulation which is to continue indefinitely.        Follow-up in February        Chart data reviewed including review of laboratory studies, prior office notes, and imaging results. ECG independently interpreted. Total evaluation time of 60 minutes, office visit inclusive.       Devyn Collado MD

## 2024-10-04 LAB
ATRIAL RATE: 72 BPM
P AXIS: 69 DEGREES
P OFFSET: 187 MS
P ONSET: 130 MS
PR INTERVAL: 172 MS
Q ONSET: 216 MS
QRS COUNT: 12 BEATS
QRS DURATION: 134 MS
QT INTERVAL: 412 MS
QTC CALCULATION(BAZETT): 451 MS
QTC FREDERICIA: 438 MS
R AXIS: -29 DEGREES
T AXIS: 209 DEGREES
T OFFSET: 422 MS
VENTRICULAR RATE: 72 BPM

## 2025-01-30 DIAGNOSIS — Z95.5 STATUS POST INSERTION OF DRUG-ELUTING STENT INTO LEFT ANTERIOR DESCENDING (LAD) ARTERY: Chronic | ICD-10-CM

## 2025-01-30 DIAGNOSIS — I10 BENIGN HYPERTENSION: ICD-10-CM

## 2025-01-30 DIAGNOSIS — I21.4 NON-ST ELEVATION MYOCARDIAL INFARCTION (NSTEMI), SUBENDOCARDIAL INFARCTION, SUBSEQUENT EPISODE OF CARE (MULTI): ICD-10-CM

## 2025-01-30 DIAGNOSIS — I50.42 CHRONIC COMBINED SYSTOLIC AND DIASTOLIC CHF (CONGESTIVE HEART FAILURE): ICD-10-CM

## 2025-01-30 RX ORDER — CLOPIDOGREL BISULFATE 75 MG/1
75 TABLET ORAL EVERY MORNING
Qty: 28 TABLET | Refills: 0 | Status: SHIPPED | OUTPATIENT
Start: 2025-01-30

## 2025-01-30 RX ORDER — SPIRONOLACTONE 50 MG/1
50 TABLET, FILM COATED ORAL
Qty: 90 TABLET | Refills: 3 | Status: SHIPPED | OUTPATIENT
Start: 2025-01-30 | End: 2026-01-30

## 2025-01-30 RX ORDER — ATORVASTATIN CALCIUM 80 MG/1
80 TABLET, FILM COATED ORAL DAILY
Qty: 90 TABLET | Refills: 3 | Status: SHIPPED | OUTPATIENT
Start: 2025-01-30 | End: 2026-01-30

## 2025-02-03 ENCOUNTER — OFFICE VISIT (OUTPATIENT)
Dept: CARDIOLOGY | Facility: HOSPITAL | Age: 70
End: 2025-02-03
Payer: COMMERCIAL

## 2025-02-03 VITALS
OXYGEN SATURATION: 96 % | BODY MASS INDEX: 30.01 KG/M2 | HEART RATE: 66 BPM | WEIGHT: 198 LBS | DIASTOLIC BLOOD PRESSURE: 73 MMHG | SYSTOLIC BLOOD PRESSURE: 135 MMHG | HEIGHT: 68 IN

## 2025-02-03 DIAGNOSIS — Z95.5 HISTORY OF CORONARY ARTERY STENT PLACEMENT: Primary | ICD-10-CM

## 2025-02-03 DIAGNOSIS — I48.92 ATRIAL FIBRILLATION/FLUTTER (MULTI): ICD-10-CM

## 2025-02-03 DIAGNOSIS — I10 BENIGN HYPERTENSION: ICD-10-CM

## 2025-02-03 DIAGNOSIS — I48.91 ATRIAL FIBRILLATION/FLUTTER (MULTI): ICD-10-CM

## 2025-02-03 DIAGNOSIS — I50.22 HEART FAILURE WITH MID-RANGE EJECTION FRACTION (HFMEF): ICD-10-CM

## 2025-02-03 PROBLEM — I50.9 HEART FAILURE: Status: RESOLVED | Noted: 2023-10-19 | Resolved: 2025-02-03

## 2025-02-03 PROBLEM — R07.9 CHEST PAIN, UNSPECIFIED TYPE: Status: RESOLVED | Noted: 2024-02-28 | Resolved: 2025-02-03

## 2025-02-03 PROCEDURE — G2211 COMPLEX E/M VISIT ADD ON: HCPCS | Performed by: INTERNAL MEDICINE

## 2025-02-03 PROCEDURE — 93005 ELECTROCARDIOGRAM TRACING: CPT | Performed by: INTERNAL MEDICINE

## 2025-02-03 PROCEDURE — 3078F DIAST BP <80 MM HG: CPT | Performed by: INTERNAL MEDICINE

## 2025-02-03 PROCEDURE — 99214 OFFICE O/P EST MOD 30 MIN: CPT | Performed by: INTERNAL MEDICINE

## 2025-02-03 PROCEDURE — 1159F MED LIST DOCD IN RCRD: CPT | Performed by: INTERNAL MEDICINE

## 2025-02-03 PROCEDURE — 3008F BODY MASS INDEX DOCD: CPT | Performed by: INTERNAL MEDICINE

## 2025-02-03 PROCEDURE — 3075F SYST BP GE 130 - 139MM HG: CPT | Performed by: INTERNAL MEDICINE

## 2025-02-03 RX ORDER — ASPIRIN 81 MG/1
81 TABLET ORAL DAILY
Start: 2025-02-03 | End: 2026-02-03

## 2025-02-03 ASSESSMENT — ENCOUNTER SYMPTOMS
OCCASIONAL FEELINGS OF UNSTEADINESS: 0
LOSS OF SENSATION IN FEET: 0
DEPRESSION: 0

## 2025-02-03 NOTE — PROGRESS NOTES
"Chief Complaint:   No chief complaint on file.     History Of Present Illness:    Marco Henderson III is a 69 y.o. male here to for follow-up. 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      He reports doing well and offers no cardiovascular complaints. Has been exercising regularly. Has been compliant with his medications. Tries to eat a healthy diet but admits to some poor choices now and again.      Last Recorded Vitals:  Vitals:    02/03/25 1410   BP: 135/73   Pulse: 66   SpO2: 96%   Weight: 89.8 kg (198 lb)   Height: 1.727 m (5' 8\")             Allergies:  Lisinopril and Valsartan    Outpatient Medications:  Current Outpatient Medications   Medication Instructions    allopurinol (Zyloprim) 300 mg tablet 1 tablet, oral, Daily    apixaban (ELIQUIS) 5 mg, oral, 2 times daily    atorvastatin (LIPITOR) 80 mg, oral, Daily    carvedilol (COREG) 25 mg, oral, 2 times daily (morning and late afternoon)    clopidogrel (PLAVIX) 75 mg, oral, Every morning    Farxiga 10 mg, oral, Every morning    ferrous gluconate (FERGON) 324 mg, oral, Daily RT    furosemide (LASIX) 20 mg, oral, Daily    hydrALAZINE (APRESOLINE) 100 mg, oral, 3 times daily (morning, midday, late afternoon)    isosorbide mononitrate ER (IMDUR) 120 mg, oral, Every morning    multivit-mins/iron/folic/lycop (CENTRUM MEN ORAL) 1 tablet, oral, Daily    nitroglycerin (NITROSTAT) 0.4 mg, sublingual, As needed    spironolactone (ALDACTONE) 50 mg, oral, Daily before breakfast    tamsulosin (Flomax) 0.4 mg 24 hr capsule TAKE 1 CAPSULE BY MOUTH EVERY DAY 1/2 HOUR FOLLOWING THE SAME MEAL EACH DAY         Physical Exam:  Gen Well appearing septuagenarian male sitting up in NAD. Body mass index is 30.11 kg/m².   CV rrr. No m/r/g appreciated. No JVD or " leg edema.   Pulm Lungs clear with normal respiratory effort.  Neuro Alert and conversant. Grossly nonfocal.       I reviewed the patient's ECG - NSR. LVH with QRS widening and repolarization abnormality.            Assessment/Plan   1.  CAD s/p multivessel PCI  RCA PCI x 2 '05 and '07, LAD PCI 3/2024. Stable by available metrics. Indefinite statin. On clopidogrel through the end of this month after which he is to switch to an 81 mg dose of aspirin indefinitely though with continued Eliquis use. Last LDL acceptable. Continued exercise encouraged.     2. HFmrEF  Chronic & stable. Euvolemic on a reasonable regimen. Intolerant to ace-I/arb.    3. Hypertension   BP well controlled. His present regimen is to continue.     4. History of atrial flutter / fibrillation  S/p flutter ablation. History of atrial fibrillation as well being paroxysmal. On Eliquis for anticoagulation which is to continue indefinitely.        Follow-up in 6 months then yearly if he remains stable.           Devyn Collado MD

## 2025-02-04 LAB
ATRIAL RATE: 66 BPM
P AXIS: 67 DEGREES
P OFFSET: 185 MS
P ONSET: 128 MS
PR INTERVAL: 178 MS
Q ONSET: 217 MS
QRS COUNT: 11 BEATS
QRS DURATION: 134 MS
QT INTERVAL: 404 MS
QTC CALCULATION(BAZETT): 423 MS
QTC FREDERICIA: 417 MS
R AXIS: -55 DEGREES
T AXIS: 111 DEGREES
T OFFSET: 419 MS
VENTRICULAR RATE: 66 BPM

## 2025-08-06 ENCOUNTER — APPOINTMENT (OUTPATIENT)
Dept: CARDIOLOGY | Facility: HOSPITAL | Age: 70
End: 2025-08-06
Payer: COMMERCIAL

## 2025-08-18 ENCOUNTER — OFFICE VISIT (OUTPATIENT)
Dept: CARDIOLOGY | Facility: HOSPITAL | Age: 70
End: 2025-08-18
Payer: COMMERCIAL

## 2025-08-18 DIAGNOSIS — Z95.5 HISTORY OF CORONARY ARTERY STENT PLACEMENT: Primary | ICD-10-CM

## 2025-08-18 DIAGNOSIS — I50.22 HEART FAILURE WITH MID-RANGE EJECTION FRACTION (HFMEF): ICD-10-CM

## 2025-08-18 DIAGNOSIS — I48.91 ATRIAL FIBRILLATION/FLUTTER (MULTI): ICD-10-CM

## 2025-08-18 DIAGNOSIS — I48.92 ATRIAL FIBRILLATION/FLUTTER (MULTI): ICD-10-CM

## 2025-08-18 DIAGNOSIS — I10 BENIGN HYPERTENSION: ICD-10-CM

## 2025-08-18 PROCEDURE — 99213 OFFICE O/P EST LOW 20 MIN: CPT | Performed by: INTERNAL MEDICINE

## 2025-08-18 PROCEDURE — 1159F MED LIST DOCD IN RCRD: CPT | Performed by: INTERNAL MEDICINE

## 2025-08-18 PROCEDURE — G2211 COMPLEX E/M VISIT ADD ON: HCPCS | Performed by: INTERNAL MEDICINE

## 2025-08-18 PROCEDURE — 3079F DIAST BP 80-89 MM HG: CPT | Performed by: INTERNAL MEDICINE

## 2025-08-18 PROCEDURE — 93005 ELECTROCARDIOGRAM TRACING: CPT | Performed by: INTERNAL MEDICINE

## 2025-08-18 PROCEDURE — 3008F BODY MASS INDEX DOCD: CPT | Performed by: INTERNAL MEDICINE

## 2025-08-18 PROCEDURE — 99212 OFFICE O/P EST SF 10 MIN: CPT | Mod: 25

## 2025-08-18 PROCEDURE — 93010 ELECTROCARDIOGRAM REPORT: CPT | Performed by: INTERNAL MEDICINE

## 2025-08-18 PROCEDURE — 3077F SYST BP >= 140 MM HG: CPT | Performed by: INTERNAL MEDICINE

## 2025-08-24 LAB
ATRIAL RATE: 60 BPM
P AXIS: 42 DEGREES
P OFFSET: 172 MS
P ONSET: 125 MS
PR INTERVAL: 184 MS
Q ONSET: 217 MS
QRS COUNT: 10 BEATS
QRS DURATION: 142 MS
QT INTERVAL: 436 MS
QTC CALCULATION(BAZETT): 436 MS
QTC FREDERICIA: 436 MS
R AXIS: -56 DEGREES
T AXIS: 168 DEGREES
T OFFSET: 435 MS
VENTRICULAR RATE: 60 BPM

## (undated) DEVICE — CATHETER, DRAGONFLY, OPSTAR

## (undated) DEVICE — GUIDEWIRE, PRESSURE WIRE X , 175CM WIRELESS, AGILE TIP

## (undated) DEVICE — CATHETER, BALLOON, NC EUPHORA NONCOMPLIANT 3.0 X 12 X 142CM

## (undated) DEVICE — Device

## (undated) DEVICE — GUIDEWIRE, INQWIRE, 3MM J, .035, 260

## (undated) DEVICE — GUIDEWIRE, HI-TORQUE, VERSACORE, 260CM, FLOPPY

## (undated) DEVICE — TR BAND, RADIAL COMPRESSION, STANDARD, 24CM

## (undated) DEVICE — CATHETER, BALLOON DILATION, EUPHORA SEMICOMPLIANT 2.0  X 12 MM X 142CM

## (undated) DEVICE — CATHETER, BALLOON, NC EUPHORA NONCOMPLIANT 3.5 X 12 X 142CM

## (undated) DEVICE — CATHETER, GUIDING, LAUNCHER, 6FR EBU 3.5

## (undated) DEVICE — CATHETER, ANGIO, IMPULSE, FL3.5, 5 FR X 100 CM

## (undated) DEVICE — CATHETER, GUIDING, LAUNCHER, 6FR, JL 3.5

## (undated) DEVICE — CATHETER, ANGIO, IMPULSE, FR4, 5 FR X 100 CM

## (undated) DEVICE — GLIDESHEATH, SLENDER 6FR, 10CM, NITINOL KIT

## (undated) DEVICE — VALVE, HEMOSTASIS, GUARDIAN II NC, W/ GUIDEWIRE INSERTION TOOL

## (undated) DEVICE — GUIDEWIRE, RUN THROUGH WIRE, 180CM

## (undated) DEVICE — CATHETER, GUIDING LAUNCHER 6FR EBU 3.75 LEFT

## (undated) DEVICE — INFLATION KIT, ADVANTAGE, ENCORE 26 (1/BOX)